# Patient Record
Sex: FEMALE | Race: WHITE | NOT HISPANIC OR LATINO | Employment: OTHER | URBAN - METROPOLITAN AREA
[De-identification: names, ages, dates, MRNs, and addresses within clinical notes are randomized per-mention and may not be internally consistent; named-entity substitution may affect disease eponyms.]

---

## 2023-02-11 PROBLEM — E55.9 VITAMIN D DEFICIENCY: Status: ACTIVE | Noted: 2023-02-11

## 2023-02-11 PROBLEM — R09.81 SINUS CONGESTION: Status: ACTIVE | Noted: 2023-02-11

## 2023-02-11 PROBLEM — H92.09 EAR PAIN: Status: ACTIVE | Noted: 2023-02-11

## 2023-02-11 PROBLEM — L98.9 LESION OF SKIN OF SCALP: Status: ACTIVE | Noted: 2023-02-11

## 2023-02-11 PROBLEM — K59.00 CONSTIPATION: Status: ACTIVE | Noted: 2023-02-11

## 2023-02-11 PROBLEM — S46.219A RUPTURE OF BICEPS TENDON: Status: ACTIVE | Noted: 2023-02-11

## 2023-02-11 PROBLEM — N39.0 RECURRENT UTI: Status: ACTIVE | Noted: 2023-02-11

## 2023-02-11 PROBLEM — M62.81 GENERALIZED MUSCLE WEAKNESS: Status: ACTIVE | Noted: 2023-02-11

## 2023-02-11 PROBLEM — L30.9 DERMATITIS: Status: ACTIVE | Noted: 2023-02-11

## 2023-02-11 PROBLEM — F41.9 ANXIETY DISORDER: Status: ACTIVE | Noted: 2023-02-11

## 2023-02-11 PROBLEM — E06.3 ACQUIRED AUTOIMMUNE HYPOTHYROIDISM: Status: ACTIVE | Noted: 2023-02-11

## 2023-02-11 PROBLEM — N31.9 NEUROGENIC BLADDER: Status: ACTIVE | Noted: 2023-02-11

## 2023-02-11 PROBLEM — L03.119 CELLULITIS OF LEG: Status: ACTIVE | Noted: 2023-02-11

## 2023-02-11 PROBLEM — R21 RASH OF HANDS: Status: ACTIVE | Noted: 2023-02-11

## 2023-02-11 PROBLEM — R53.83 FATIGUE: Status: ACTIVE | Noted: 2023-02-11

## 2023-02-11 PROBLEM — R33.9 INCOMPLETE BLADDER EMPTYING: Status: ACTIVE | Noted: 2023-02-11

## 2023-02-11 PROBLEM — D72.819 LEUKOPENIA: Status: ACTIVE | Noted: 2023-02-11

## 2023-02-11 PROBLEM — N95.2 ATROPHIC VAGINITIS: Status: ACTIVE | Noted: 2023-02-11

## 2023-02-11 PROBLEM — N39.0 UTI (URINARY TRACT INFECTION): Status: ACTIVE | Noted: 2023-02-11

## 2023-02-11 PROBLEM — N36.44 DSD (DETRUSOR AND SPHINCTER DYSSYNERGIA): Status: ACTIVE | Noted: 2023-02-11

## 2023-02-11 PROBLEM — R53.2: Status: ACTIVE | Noted: 2023-02-11

## 2023-02-11 PROBLEM — L21.9 SEBORRHEA: Status: ACTIVE | Noted: 2023-02-11

## 2023-02-11 PROBLEM — H10.30 ACUTE CONJUNCTIVITIS: Status: ACTIVE | Noted: 2023-02-11

## 2023-02-11 PROBLEM — J20.9 ACUTE BRONCHITIS: Status: ACTIVE | Noted: 2023-02-11

## 2023-02-11 PROBLEM — R93.1 ELEVATED CORONARY ARTERY CALCIUM SCORE: Status: ACTIVE | Noted: 2023-02-11

## 2023-02-11 PROBLEM — G35 MULTIPLE SCLEROSIS (MULTI): Status: ACTIVE | Noted: 2023-02-11

## 2023-02-11 PROBLEM — M54.50 LOWER BACK PAIN: Status: ACTIVE | Noted: 2023-02-11

## 2023-02-11 PROBLEM — N39.41 URGE INCONTINENCE: Status: ACTIVE | Noted: 2023-02-11

## 2023-02-11 PROBLEM — H69.90 EUSTACHIAN TUBE DYSFUNCTION: Status: ACTIVE | Noted: 2023-02-11

## 2023-02-11 PROBLEM — M75.21 BICEPS TENDINITIS OF RIGHT UPPER EXTREMITY: Status: ACTIVE | Noted: 2023-02-11

## 2023-02-11 PROBLEM — G35: Status: ACTIVE | Noted: 2023-02-11

## 2023-02-11 PROBLEM — M79.603 ARM PAIN: Status: ACTIVE | Noted: 2023-02-11

## 2023-02-11 PROBLEM — A08.4 VIRAL GASTROENTERITIS: Status: ACTIVE | Noted: 2023-02-11

## 2023-02-11 PROBLEM — A04.72 C. DIFFICILE COLITIS: Status: ACTIVE | Noted: 2023-02-11

## 2023-02-11 PROBLEM — N39.46 URINARY INCONTINENCE, MIXED: Status: ACTIVE | Noted: 2023-02-11

## 2023-02-11 PROBLEM — I25.10 CORONARY ARTERY DISEASE: Status: ACTIVE | Noted: 2023-02-11

## 2023-02-11 PROBLEM — E78.5 HYPERLIPIDEMIA: Status: ACTIVE | Noted: 2023-02-11

## 2023-02-11 PROBLEM — M25.519 SHOULDER PAIN: Status: ACTIVE | Noted: 2023-02-11

## 2023-02-11 RX ORDER — LANOLIN ALCOHOL/MO/W.PET/CERES
1 CREAM (GRAM) TOPICAL DAILY
COMMUNITY
Start: 2020-12-10

## 2023-02-11 RX ORDER — GABAPENTIN 800 MG/1
1 TABLET ORAL 3 TIMES DAILY
COMMUNITY
Start: 2016-04-12 | End: 2024-05-16 | Stop reason: SDUPTHER

## 2023-02-11 RX ORDER — CHOLECALCIFEROL (VITAMIN D3) 25 MCG
1 TABLET ORAL DAILY
COMMUNITY
Start: 2017-04-25

## 2023-02-11 RX ORDER — ROSUVASTATIN CALCIUM 10 MG/1
1 TABLET, COATED ORAL DAILY
COMMUNITY
Start: 2021-06-23 | End: 2023-03-13 | Stop reason: SDUPTHER

## 2023-02-11 RX ORDER — PRIMIDONE 50 MG/1
2 TABLET ORAL 2 TIMES DAILY
COMMUNITY
Start: 2015-08-18

## 2023-02-11 RX ORDER — SOLIFENACIN SUCCINATE 10 MG/1
1 TABLET, FILM COATED ORAL DAILY
COMMUNITY
Start: 2021-12-17

## 2023-02-11 RX ORDER — ESTRADIOL 0.1 MG/G
CREAM VAGINAL
COMMUNITY
Start: 2022-06-27 | End: 2024-01-10 | Stop reason: SDUPTHER

## 2023-02-11 RX ORDER — CITALOPRAM 20 MG/1
1 TABLET, FILM COATED ORAL DAILY
COMMUNITY
Start: 2015-05-27 | End: 2024-03-05

## 2023-02-11 RX ORDER — LORATADINE 10 MG/1
1 TABLET ORAL DAILY PRN
COMMUNITY
Start: 2018-12-04

## 2023-02-11 RX ORDER — TRIAMCINOLONE ACETONIDE 1 MG/G
OINTMENT TOPICAL 2 TIMES DAILY
COMMUNITY
Start: 2021-08-03 | End: 2024-01-15 | Stop reason: ALTCHOICE

## 2023-02-11 RX ORDER — FLUTICASONE PROPIONATE 50 MCG
2 SPRAY, SUSPENSION (ML) NASAL DAILY
COMMUNITY
Start: 2018-11-23 | End: 2023-03-13 | Stop reason: SDUPTHER

## 2023-02-11 RX ORDER — LACTOBACILLUS RHAMNOSUS GG 10B CELL
CAPSULE ORAL
COMMUNITY
End: 2024-01-15 | Stop reason: ALTCHOICE

## 2023-02-11 RX ORDER — NORTRIPTYLINE HYDROCHLORIDE 10 MG/1
2 CAPSULE ORAL NIGHTLY
COMMUNITY
Start: 2017-04-05 | End: 2023-12-14 | Stop reason: SDUPTHER

## 2023-02-11 RX ORDER — BACLOFEN 10 MG/1
10 TABLET ORAL 4 TIMES DAILY
COMMUNITY
Start: 2016-04-12 | End: 2024-05-16 | Stop reason: SDUPTHER

## 2023-03-02 LAB
BACTERIA, URINE: ABNORMAL /HPF
MUCUS, URINE: ABNORMAL /LPF
RBC, URINE: 8 /HPF (ref 0–5)
SQUAMOUS EPITHELIAL CELLS, URINE: 2 /HPF
WBC, URINE: 25 /HPF (ref 0–5)

## 2023-03-03 LAB — URINE CULTURE: NORMAL

## 2023-03-04 NOTE — RESULT ENCOUNTER NOTE
Urine culture did show positivity but multiple organisms were present    Most likely there is some contamination within the urine culture    It is recommended that we repeat a urinalysis if the patient can    We will try to send the order in on Monday we will use the new electronic medical record    Otherwise, continue the antibiotic as prescribed

## 2023-03-07 ENCOUNTER — TELEPHONE (OUTPATIENT)
Dept: PRIMARY CARE | Facility: CLINIC | Age: 73
End: 2023-03-07
Payer: MEDICARE

## 2023-03-07 ENCOUNTER — APPOINTMENT (OUTPATIENT)
Dept: LAB | Facility: LAB | Age: 73
End: 2023-03-07
Payer: MEDICARE

## 2023-03-07 NOTE — TELEPHONE ENCOUNTER
----- Message from Piyush Stephenson, DO sent at 3/4/2023 12:28 PM EST -----  Urine culture did show positivity but multiple organisms were present    Most likely there is some contamination within the urine culture    It is recommended that we repeat a urinalysis if the patient can    We will try to send the order in on Monday we will use the new electronic medical record    Otherwise, continue the antibiotic as prescribed

## 2023-03-09 LAB — URINE CULTURE: ABNORMAL

## 2023-03-13 ENCOUNTER — LAB (OUTPATIENT)
Dept: LAB | Facility: LAB | Age: 73
End: 2023-03-13
Payer: MEDICARE

## 2023-03-13 ENCOUNTER — OFFICE VISIT (OUTPATIENT)
Dept: PRIMARY CARE | Facility: CLINIC | Age: 73
End: 2023-03-13
Payer: MEDICARE

## 2023-03-13 VITALS
BODY MASS INDEX: 19.57 KG/M2 | HEART RATE: 67 BPM | DIASTOLIC BLOOD PRESSURE: 60 MMHG | SYSTOLIC BLOOD PRESSURE: 108 MMHG | HEIGHT: 64 IN

## 2023-03-13 DIAGNOSIS — G35 MULTIPLE SCLEROSIS (MULTI): ICD-10-CM

## 2023-03-13 DIAGNOSIS — R53.2: ICD-10-CM

## 2023-03-13 DIAGNOSIS — E46 PROTEIN-CALORIE MALNUTRITION, UNSPECIFIED SEVERITY (MULTI): ICD-10-CM

## 2023-03-13 DIAGNOSIS — Z00.00 ENCOUNTER FOR MEDICARE ANNUAL WELLNESS EXAM: Primary | ICD-10-CM

## 2023-03-13 DIAGNOSIS — N30.00 ACUTE CYSTITIS WITHOUT HEMATURIA: ICD-10-CM

## 2023-03-13 DIAGNOSIS — E55.9 VITAMIN D DEFICIENCY: ICD-10-CM

## 2023-03-13 DIAGNOSIS — E53.8 B12 DEFICIENCY: ICD-10-CM

## 2023-03-13 DIAGNOSIS — G35: ICD-10-CM

## 2023-03-13 DIAGNOSIS — E78.5 HYPERLIPIDEMIA, UNSPECIFIED HYPERLIPIDEMIA TYPE: ICD-10-CM

## 2023-03-13 DIAGNOSIS — L30.9 DERMATITIS: ICD-10-CM

## 2023-03-13 DIAGNOSIS — H69.93 DYSFUNCTION OF BOTH EUSTACHIAN TUBES: ICD-10-CM

## 2023-03-13 DIAGNOSIS — Z00.00 ROUTINE GENERAL MEDICAL EXAMINATION AT HEALTH CARE FACILITY: ICD-10-CM

## 2023-03-13 DIAGNOSIS — Z12.11 ENCOUNTER FOR SCREENING FOR MALIGNANT NEOPLASM OF COLON: ICD-10-CM

## 2023-03-13 DIAGNOSIS — G47.00 INSOMNIA, UNSPECIFIED TYPE: ICD-10-CM

## 2023-03-13 DIAGNOSIS — E06.3 ACQUIRED AUTOIMMUNE HYPOTHYROIDISM: ICD-10-CM

## 2023-03-13 LAB
ALANINE AMINOTRANSFERASE (SGPT) (U/L) IN SER/PLAS: 14 U/L (ref 7–45)
ALBUMIN (G/DL) IN SER/PLAS: 4.1 G/DL (ref 3.4–5)
ALKALINE PHOSPHATASE (U/L) IN SER/PLAS: 77 U/L (ref 33–136)
ANION GAP IN SER/PLAS: 9 MMOL/L (ref 10–20)
ASPARTATE AMINOTRANSFERASE (SGOT) (U/L) IN SER/PLAS: 20 U/L (ref 9–39)
BASOPHILS (10*3/UL) IN BLOOD BY AUTOMATED COUNT: 0.06 X10E9/L (ref 0–0.1)
BASOPHILS/100 LEUKOCYTES IN BLOOD BY AUTOMATED COUNT: 2.1 % (ref 0–2)
BILIRUBIN TOTAL (MG/DL) IN SER/PLAS: 0.5 MG/DL (ref 0–1.2)
CALCIDIOL (25 OH VITAMIN D3) (NG/ML) IN SER/PLAS: 45 NG/ML
CALCIUM (MG/DL) IN SER/PLAS: 9.1 MG/DL (ref 8.6–10.3)
CARBON DIOXIDE, TOTAL (MMOL/L) IN SER/PLAS: 29 MMOL/L (ref 21–32)
CHLORIDE (MMOL/L) IN SER/PLAS: 105 MMOL/L (ref 98–107)
CHOLESTEROL (MG/DL) IN SER/PLAS: 171 MG/DL (ref 0–199)
CHOLESTEROL IN HDL (MG/DL) IN SER/PLAS: 97.8 MG/DL
CHOLESTEROL/HDL RATIO: 1.7
COBALAMIN (VITAMIN B12) (PG/ML) IN SER/PLAS: 3315 PG/ML (ref 211–911)
CREATININE (MG/DL) IN SER/PLAS: 0.75 MG/DL (ref 0.5–1.05)
EOSINOPHILS (10*3/UL) IN BLOOD BY AUTOMATED COUNT: 0.22 X10E9/L (ref 0–0.4)
EOSINOPHILS/100 LEUKOCYTES IN BLOOD BY AUTOMATED COUNT: 7.6 % (ref 0–6)
ERYTHROCYTE DISTRIBUTION WIDTH (RATIO) BY AUTOMATED COUNT: 12.8 % (ref 11.5–14.5)
ERYTHROCYTE MEAN CORPUSCULAR HEMOGLOBIN CONCENTRATION (G/DL) BY AUTOMATED: 32.4 G/DL (ref 32–36)
ERYTHROCYTE MEAN CORPUSCULAR VOLUME (FL) BY AUTOMATED COUNT: 95 FL (ref 80–100)
ERYTHROCYTES (10*6/UL) IN BLOOD BY AUTOMATED COUNT: 4.14 X10E12/L (ref 4–5.2)
GFR FEMALE: 85 ML/MIN/1.73M2
GLUCOSE (MG/DL) IN SER/PLAS: 87 MG/DL (ref 74–99)
HEMATOCRIT (%) IN BLOOD BY AUTOMATED COUNT: 39.5 % (ref 36–46)
HEMOGLOBIN (G/DL) IN BLOOD: 12.8 G/DL (ref 12–16)
IMMATURE GRANULOCYTES/100 LEUKOCYTES IN BLOOD BY AUTOMATED COUNT: 0.3 % (ref 0–0.9)
LDL: 63 MG/DL (ref 0–99)
LEUKOCYTES (10*3/UL) IN BLOOD BY AUTOMATED COUNT: 2.9 X10E9/L (ref 4.4–11.3)
LYMPHOCYTES (10*3/UL) IN BLOOD BY AUTOMATED COUNT: 0.95 X10E9/L (ref 0.8–3)
LYMPHOCYTES/100 LEUKOCYTES IN BLOOD BY AUTOMATED COUNT: 32.8 % (ref 13–44)
MONOCYTES (10*3/UL) IN BLOOD BY AUTOMATED COUNT: 0.35 X10E9/L (ref 0.05–0.8)
MONOCYTES/100 LEUKOCYTES IN BLOOD BY AUTOMATED COUNT: 12.1 % (ref 2–10)
NEUTROPHILS (10*3/UL) IN BLOOD BY AUTOMATED COUNT: 1.31 X10E9/L (ref 1.6–5.5)
NEUTROPHILS/100 LEUKOCYTES IN BLOOD BY AUTOMATED COUNT: 45.1 % (ref 40–80)
PLATELETS (10*3/UL) IN BLOOD AUTOMATED COUNT: 200 X10E9/L (ref 150–450)
POTASSIUM (MMOL/L) IN SER/PLAS: 3.8 MMOL/L (ref 3.5–5.3)
PROTEIN TOTAL: 6.9 G/DL (ref 6.4–8.2)
SODIUM (MMOL/L) IN SER/PLAS: 139 MMOL/L (ref 136–145)
THYROTROPIN (MIU/L) IN SER/PLAS BY DETECTION LIMIT <= 0.05 MIU/L: 1.47 MIU/L (ref 0.44–3.98)
TRIGLYCERIDE (MG/DL) IN SER/PLAS: 50 MG/DL (ref 0–149)
UREA NITROGEN (MG/DL) IN SER/PLAS: 14 MG/DL (ref 6–23)
VLDL: 10 MG/DL (ref 0–40)

## 2023-03-13 PROCEDURE — 1036F TOBACCO NON-USER: CPT | Performed by: STUDENT IN AN ORGANIZED HEALTH CARE EDUCATION/TRAINING PROGRAM

## 2023-03-13 PROCEDURE — 1170F FXNL STATUS ASSESSED: CPT | Performed by: STUDENT IN AN ORGANIZED HEALTH CARE EDUCATION/TRAINING PROGRAM

## 2023-03-13 PROCEDURE — 1159F MED LIST DOCD IN RCRD: CPT | Performed by: STUDENT IN AN ORGANIZED HEALTH CARE EDUCATION/TRAINING PROGRAM

## 2023-03-13 PROCEDURE — 82306 VITAMIN D 25 HYDROXY: CPT

## 2023-03-13 PROCEDURE — 82607 VITAMIN B-12: CPT

## 2023-03-13 PROCEDURE — G0439 PPPS, SUBSEQ VISIT: HCPCS | Performed by: STUDENT IN AN ORGANIZED HEALTH CARE EDUCATION/TRAINING PROGRAM

## 2023-03-13 PROCEDURE — 80061 LIPID PANEL: CPT

## 2023-03-13 PROCEDURE — 99215 OFFICE O/P EST HI 40 MIN: CPT | Performed by: STUDENT IN AN ORGANIZED HEALTH CARE EDUCATION/TRAINING PROGRAM

## 2023-03-13 PROCEDURE — 85025 COMPLETE CBC W/AUTO DIFF WBC: CPT

## 2023-03-13 PROCEDURE — 1160F RVW MEDS BY RX/DR IN RCRD: CPT | Performed by: STUDENT IN AN ORGANIZED HEALTH CARE EDUCATION/TRAINING PROGRAM

## 2023-03-13 PROCEDURE — 80053 COMPREHEN METABOLIC PANEL: CPT

## 2023-03-13 PROCEDURE — 84443 ASSAY THYROID STIM HORMONE: CPT

## 2023-03-13 PROCEDURE — 36415 COLL VENOUS BLD VENIPUNCTURE: CPT

## 2023-03-13 RX ORDER — ROSUVASTATIN CALCIUM 10 MG/1
10 TABLET, COATED ORAL DAILY
Qty: 90 TABLET | Refills: 1 | Status: SHIPPED | OUTPATIENT
Start: 2023-03-13 | End: 2023-08-22

## 2023-03-13 RX ORDER — FLUTICASONE PROPIONATE 50 MCG
2 SPRAY, SUSPENSION (ML) NASAL DAILY
Qty: 16 G | Refills: 2 | Status: SHIPPED | OUTPATIENT
Start: 2023-03-13 | End: 2023-08-22

## 2023-03-13 ASSESSMENT — PATIENT HEALTH QUESTIONNAIRE - PHQ9
SUM OF ALL RESPONSES TO PHQ9 QUESTIONS 1 AND 2: 0
1. LITTLE INTEREST OR PLEASURE IN DOING THINGS: NOT AT ALL
2. FEELING DOWN, DEPRESSED OR HOPELESS: NOT AT ALL

## 2023-03-13 ASSESSMENT — ENCOUNTER SYMPTOMS
LOSS OF SENSATION IN FEET: 0
DEPRESSION: 0
OCCASIONAL FEELINGS OF UNSTEADINESS: 1

## 2023-03-13 ASSESSMENT — ACTIVITIES OF DAILY LIVING (ADL)
BATHING: NEEDS ASSISTANCE
DOING_HOUSEWORK: NEEDS ASSISTANCE
MANAGING_FINANCES: INDEPENDENT
TAKING_MEDICATION: INDEPENDENT
DRESSING: NEEDS ASSISTANCE
GROCERY_SHOPPING: NEEDS ASSISTANCE

## 2023-03-13 NOTE — PROGRESS NOTES
Subjective   Reason for Visit: Coby Bonner is an 72 y.o. female here for a Medicare Wellness visit.          Reviewed all medications by prescribing practitioner or clinical pharmacist (such as prescriptions, OTCs, herbal therapies and supplements) and documented in the medical record.    HPI    Patient Self Assessment of Health Status  Patient Self Assessment: Fair    Nutrition and Exercise  Current Diet: Well Balanced Diet, Heart Healthy Diet  Adequate Fluid Intake: Yes  Caffeine: No  Exercise Frequency: No Exercise    Functional Ability/Level of Safety  Cognitive Impairment Observed: No cognitive impairment observed    Home Safety Risk Factors: None    Patient Care Team:  Piyush Stephenson DO as PCP - General  Piyush Stephenson, DO as PCP - [facility] Attributed Provider     Health Maintenance/MCR  Overall patient is doing well with some concerns.  Shingrix: 1/2018 PCV13:12/2016 PPSV23 9/2020  COVID-19 vaccine 3 doses of pfizer   Mammogram complete 01/2023 for annual repeat.  Last colonoscopy ten years ago with repeat this year. Denies family history of colon cancer. Requesting requisition for cologuard test.   Reports a healthy, balanced diet and limited activity due to MS.   Denies tobacco or etoh use.     2. Hyperlipidemia  Currently taking rosuvastatin 10 mg daily. Requesting refills.   Last lipid panel in March 2022 within normal limits.  CT Cardiac Calcium score completed December 2020 with score of 523.13.  Follows with Dr. Weller    3. Eustachian Tube Dysfunction  Uses fluticasone spray daily. Requesting refills.  Reports feels stable on this medication.    4.Functional quadriplegia secondary to multiple sclerosis  Follows with neurology Dr. Harding who unfortunately retired and now recently saw a new neurologist earlier this month.  Reports feeling stable currently.   Continues to take Baclofen, Nortriptyline, Primidone     5. Dermatitis   Mildly pruritic, erythematous plaque on patient's right dorsal  "wrist that has been present for months.  Has attempted to treat for past month at home with hydrocortisone and neosporin without relief.  Denies tenderness, limited range of motion in wrist, swelling of joint, fevers.   Was previously treated for dermatitis elsewhere with triamcinolone.   Previously saw derm for basal cell removal and willing to follow with them again.     6. Urinary Tract Infection  Follows with urology for frequent UTIs and neurogenic bladder.  Self-caths 6 times daily.   Reporting two days left on current Nitrofurantoin course for UTI and reports feeling back to baseline with no current odor.     7. Insomnia  Complaining insomnia for 1+ month.   Denies difficulty falling asleep, but will awaken in the night with the urge to urinate and is unable to fall asleep afterwards.  Reports ~4 hours of sleep nightly.     8. Acquired autoimmune hypothyroidism  Has a history of elevated TSH of 4.04 in October of 2020   Repeat blood work on 03/2022 showed levels back to a normal range   Denies any signs of symptoms of hypothyroidism     9. Vitamin B12 Deficiency  Last Vitamin B12 level was 177 on 12/2020  Currently taking B12 supplements daily.     10. Vitamin D Deficiency   Last Vitamin D level was 24 on 9/2021  Is currently taking Vitamin D tablets       Review of Systems   All other systems reviewed and are negative.    Objective   Vitals:  /60 (BP Location: Left arm, Patient Position: Sitting, BP Cuff Size: Adult)   Pulse 67   Ht 1.626 m (5' 4\")   BMI 19.57 kg/m²       Physical Exam    CONSTITUTIONAL - well nourished, well developed, looks like stated age, in no acute distress, not ill-appearing, and not tired appearing  SKIN - dry skin with slight rash on right dorsal wrist  HEAD - no trauma, normocephalic  EYES - extraocular muscles are intact, and normal external exam  ENT - TM's intact, no injection, no signs of infection, uvula midline, normal tongue movement and throat normal, no " exudate  NECK - supple without rigidity, no neck mass was observed, no thyromegaly or thyroid nodules  CHEST - clear to auscultation, no wheezing, no crackles and no rales, good effort  CARDIAC - regular rate and regular rhythm, no skipped beats, no murmur  ABDOMEN - no organomegaly, soft, nontender, nondistended, no guarding/rebound/rigidity,   EXTREMITIES - +1 bilateral pitting edema, no deformities  NEUROLOGICAL - abnormal/slow gait, essential tremor, irregular and abnormal balance, uses a walker   PSYCHIATRIC - alert, pleasant and cordial, age-appropriate  IMMUNOLOGIC - no cervical lymphadenopathy      Assessment/Plan     Health Maintenance/[facility]  A complete history and physical were performed today.  Please continue to attempt to eat a healthy, balanced diet and stay active.  We provided you with a requisition for cologuard colon cancer screening for you to complete at your convenience.     Hyperlipidemia  Continue taking rosuvastatin 10 mg daily.  Refill have been sent to your pharmacy.  We ordered blood work today.  We will notify you of the results and make treatment recommendations accordingly.   Continue to follow with Dr. Weller as needed.    Eustachian Tube Dysfunction  Use fluticasone spray daily.   Refills have been sent to your pharmacy.    Functional quadriplegia secondary to multiple sclerosis  Continue to follow with neurology Dr. Harding.   Continue to take Baclofen, Nortriptyline, Primidone as he prescribes.    Dermatitis   We gave you a referral for dermatology today.  Please schedule with them at your earliest mutual convenience.     Urinary Tract Infection  Continue to follow with urology.  Stable, no changes in recommendations.    Insomnia  I'm sorry to hear you are having difficulty with sleep.  Please follow up with your neurologist for treatment recommendations regarding this.     Acquired autoimmune hypothyroidism  We ordered blood work today.  We will notify you of the results and make  treatment recommendations accordingly.     Vitamin B12 Deficiency  We ordered blood work today.  We will notify you of the results and make treatment recommendations accordingly.   Continue to take your Vitamin B12 supplement.    Vitamin D Deficiency   We ordered blood work today.  We will notify you of the results and make treatment recommendations accordingly.   Continue to take your Vitamin D supplement.    If you have any questions or concerns regarding your care please call the office.

## 2023-03-13 NOTE — RESULT ENCOUNTER NOTE
Vitamin B12 very elevated at 3315  I would recommend that she slowly decreases the amount of B12 supplementation she is taking    Otherwise sugar, kidneys, liver, electrolytes are all within normal limits    Complete blood cell count continues to show a slightly low white blood cell count of 2.9 but this has been stable and no anemia noted    Vitamin D well within normal limits at 45    Thyroid well within normal limits at 1.47    Cholesterol looks fantastic at 171, HDL 97, LDL 63, triglycerides 50    Continue all medications without any changes

## 2023-03-14 ENCOUNTER — TELEPHONE (OUTPATIENT)
Dept: PRIMARY CARE | Facility: CLINIC | Age: 73
End: 2023-03-14
Payer: MEDICARE

## 2023-03-14 NOTE — TELEPHONE ENCOUNTER
----- Message from Piyush Stephenson,  sent at 3/13/2023  5:25 PM EDT -----  Vitamin B12 very elevated at 3315  I would recommend that she slowly decreases the amount of B12 supplementation she is taking    Otherwise sugar, kidneys, liver, electrolytes are all within normal limits    Complete blood cell count continues to show a slightly low white blood cell count of 2.9 but this has been stable and no anemia noted    Vitamin D well within normal limits at 45    Thyroid well within normal limits at 1.47    Cholesterol looks fantastic at 171, HDL 97, LDL 63, triglycerides 50    Continue all medications without any changes

## 2023-03-21 LAB — NONINV COLON CA DNA+OCC BLD SCRN STL QL: NORMAL

## 2023-03-21 NOTE — RESULT ENCOUNTER NOTE
Unfortunately Kavon was not able to be processed    I would recommend that she will have to do another sample and we will make recommendations after that    I guess the actual [employer] company will reach out to her in the near future

## 2023-03-23 ENCOUNTER — TELEPHONE (OUTPATIENT)
Dept: PRIMARY CARE | Facility: CLINIC | Age: 73
End: 2023-03-23
Payer: MEDICARE

## 2023-04-20 LAB — NONINV COLON CA DNA+OCC BLD SCRN STL QL: NEGATIVE

## 2023-04-26 NOTE — RESULT ENCOUNTER NOTE
Colkourtneyrd for colon cancer screening is negative    We will continue screening in 3 years, due 2026 Drysol Pregnancy And Lactation Text: This medication is considered safe during pregnancy and breast feeding.

## 2023-08-22 DIAGNOSIS — H69.93 DYSFUNCTION OF BOTH EUSTACHIAN TUBES: ICD-10-CM

## 2023-08-22 DIAGNOSIS — E78.5 HYPERLIPIDEMIA, UNSPECIFIED HYPERLIPIDEMIA TYPE: ICD-10-CM

## 2023-08-22 RX ORDER — FLUTICASONE PROPIONATE 50 MCG
2 SPRAY, SUSPENSION (ML) NASAL DAILY
Qty: 16 ML | Refills: 2 | Status: SHIPPED | OUTPATIENT
Start: 2023-08-22 | End: 2024-01-15 | Stop reason: SDUPTHER

## 2023-08-22 RX ORDER — ROSUVASTATIN CALCIUM 10 MG/1
10 TABLET, COATED ORAL DAILY
Qty: 90 TABLET | Refills: 0 | Status: SHIPPED | OUTPATIENT
Start: 2023-08-22 | End: 2023-11-16

## 2023-10-02 ENCOUNTER — PROCEDURE VISIT (OUTPATIENT)
Dept: UROLOGY | Facility: CLINIC | Age: 73
End: 2023-10-02
Payer: MEDICARE

## 2023-10-02 DIAGNOSIS — N30.00 ACUTE CYSTITIS WITHOUT HEMATURIA: Primary | ICD-10-CM

## 2023-10-02 DIAGNOSIS — N39.41 URGE INCONTINENCE: ICD-10-CM

## 2023-10-02 DIAGNOSIS — N31.9 NEUROGENIC BLADDER: ICD-10-CM

## 2023-10-02 DIAGNOSIS — N30.00 ACUTE CYSTITIS WITHOUT HEMATURIA: ICD-10-CM

## 2023-10-02 DIAGNOSIS — R33.9 INCOMPLETE BLADDER EMPTYING: ICD-10-CM

## 2023-10-02 DIAGNOSIS — N39.0 RECURRENT UTI: ICD-10-CM

## 2023-10-02 DIAGNOSIS — N95.2 ATROPHIC VAGINITIS: ICD-10-CM

## 2023-10-02 DIAGNOSIS — G35 MULTIPLE SCLEROSIS (MULTI): ICD-10-CM

## 2023-10-02 LAB
POC APPEARANCE, URINE: CLEAR
POC BILIRUBIN, URINE: NEGATIVE
POC BLOOD, URINE: ABNORMAL
POC COLOR, URINE: YELLOW
POC GLUCOSE, URINE: NEGATIVE MG/DL
POC KETONES, URINE: NEGATIVE MG/DL
POC LEUKOCYTES, URINE: ABNORMAL
POC NITRITE,URINE: POSITIVE
POC PH, URINE: 7.5 PH
POC PROTEIN, URINE: NEGATIVE MG/DL
POC SPECIFIC GRAVITY, URINE: 1.02
POC UROBILINOGEN, URINE: 0.2 EU/DL

## 2023-10-02 PROCEDURE — 99213 OFFICE O/P EST LOW 20 MIN: CPT | Performed by: OBSTETRICS & GYNECOLOGY

## 2023-10-02 PROCEDURE — 99213 OFFICE O/P EST LOW 20 MIN: CPT | Mod: PN | Performed by: OBSTETRICS & GYNECOLOGY

## 2023-10-02 PROCEDURE — 81003 URINALYSIS AUTO W/O SCOPE: CPT | Mod: QW,PN | Performed by: OBSTETRICS & GYNECOLOGY

## 2023-10-02 RX ORDER — SULFAMETHOXAZOLE AND TRIMETHOPRIM 800; 160 MG/1; MG/1
1 TABLET ORAL 2 TIMES DAILY
Qty: 14 TABLET | Refills: 0 | Status: SHIPPED | OUTPATIENT
Start: 2023-10-02 | End: 2023-10-09

## 2023-10-02 RX ORDER — SULFAMETHOXAZOLE AND TRIMETHOPRIM 800; 160 MG/1; MG/1
1 TABLET ORAL 2 TIMES DAILY
Qty: 14 TABLET | Refills: 0 | Status: SHIPPED | OUTPATIENT
Start: 2023-10-02 | End: 2023-10-02 | Stop reason: SDUPTHER

## 2023-10-02 NOTE — PROGRESS NOTES
Subjective   Patient ID: Coby Bonner is a 72 y.o. female who presents for No chief complaint on file..  HPI  72-year-old with chronic urinary tract infections, urinary incontinence, neurogenic bladder with longstanding history of MS having undergone InterStim stage II 10/19/2022 at the [facility] and 200 units of Botox 06/26/2023 presenting for 200 units of Botox today 10/02/2023.       The patient  appears to be having UTI like symptoms, she will be rescheduled at her earliest convenience.     She has no other complaints.      From Previous note  72-year-old with chronic urinary tract infections, urinary incontinence, neurogenic bladder with longstanding history of MS having undergone InterStim stage II 10/19/2022 at the [facility] and 200 units of Botox 06/26/2023.      She endorses relief from Botox injections. She reports experiencing leakage when she needs to urinate. She uses her catheter at night and voids every 3 hours. Her Interstim is program 4 and 1.8 . She notes leaking when she waits greater than 4 hours. She has noted terminal incontinence with her first void of the morning.     She is overall very satisfied with her present therapy.     From previous note  72-year-old with chronic urinary tract infections, urinary incontinence, neurogenic bladder with longstanding history of MS having undergone InterStim stage II 10/19/2022 at the [facility] and 200 units of Botox today 06/26/2023.       She has no other complaints.      From previous note  72-year-old with chronic urinary tract infections, urinary incontinence, neurogenic bladder with longstanding history of MS having undergone InterStim stage II 10/19/2022 at the [facility].     The patient was last seen in November 2022. continues to be overall satisfied with her InterStim and CIC use. She has not made any adjustments to her InterStim device. She is utilizing her CIC every 2-3 hours, 5-7  times daily. However she does continue to notice incontinence complaints. She notes 1-2 episodes of nocturia., she states she empties her bladder before going to bed but notes urgency after 6 hours and has to rush to the bathroom and is particularly bothered by it. She denies any UTI like symptoms.      She complains of constipation, but denies fecal or flatal incontinence.     She denies any vaginal complaints, no abnormal vaginal bleeding or discharge.      She has no other complaints.         From previous note  72-year-old with chronic urinary tract infections, urinary incontinence, neurogenic bladder with longstanding history of MS having undergone InterStim stage II on 10/19/2022.     The patient reports she is over all very satisfied with Interstim Stage II. She reports improvement in her bladder symptoms. She denies any urinary incontinence.     She complains of constipation, but denies fecal or flatal incontinence.     She denies any vaginal complaints, no abnormal vaginal bleeding or discharge.      She has no other complaints.         From previous note  71-year-old with chronic urinary tract infections, urinary incontinence, neurogenic bladder with longstanding history of MS.     The patient has increased her CIC use to roughly every 2-3 hours. She is emptying her bladder of between 5 and 8 ounces of urine. However even with this she continues to note urgency and urge related incontinence in between these catheterizations. She continues to note bothersome nighttime urgency and incontinence as well.     She denies any UTI-like symptoms today.     She has no other complaints.     From previous note  71-year-old presenting as a referral from Dr. Lopez with longstanding history of multiple sclerosis and neurogenic bladder with recurrent urinary tract infections.     The patient presents today with her . She utilizes CIC 3 times a day at 10:00, 4:00 and 10:00. Outside of these episodes she notes  "leaking. She does have near nightly enuresis as well. She has been treated on multiple occasions with 300 units Botox. Patient was most recently treated in May 2022. She stated that she had \"a few weeks of relief\" before her symptoms returned. She does believe that she has a urinary tract infection. . She could not provide a urine sample today. PVR is 84. She does not have any catheter supplies with her and would rather provide a sample tomorrow when she is visiting her primary care physician.     She otherwise denies any abnormal vaginal bleeding or discharge. She does not desire a pelvic exam today. She is not sexually active. She does have defecatory urgency.     She has no other complaints.     Review of Systems  Constitutional: No fever, No chills and No fatigue.   Eyes: No vision problems and No dryness of the eyes.   ENT: No dry mouth, No hearing loss and No nosebleeds.   Cardiovascular: No chest pain, No palpitations and No orthopnea.   Respiratory: No shortness of breath, No cough and No wheezing.   Gastrointestinal: No abdominal pain, No constipation, No nausea, No diarrhea, No vomiting and No melena.   Genitourinary: As noted in HPI.   Musculoskeletal: No back pain, No myalgias, No muscle weakness, No joint swelling and No leg edema.   Integumentary: No rashes, No skin lesion and No itching.   Neurological: No headache, No numbness and No dizziness.   Psychiatric: No sleep disturbances, No anxiety and No depression.   Endocrine: No hot flashes, No loss of hair and No hirsutism.   Hematologic/Lymphatic: No swollen glands, No tendency for easy bleeding and No tendency for easy bruising.   All other systems have been reviewed and are negative for complaint.        Objective   Physical Exam  Constitutional: Uses walker. Staccato speech, but alert and in no acute distress, well developed, well nourished.   Head and Face: head and face: unremarkable.   Eyes, Ears, Nose, Mouth, and Throat: unremarkable external " exam - nonicteric sclera, extraocular movements intact (EOMI) and no ptosis, unremarkable external inspection of ears and nose.   Neck: no neck asymmetry, supple, thyroid not enlarged and there were no palpable thyroid nodules.   Cardiovascular: unremarkable distal pulses.   Pulmonary: no respiratory distress, unremarkable respiratory effort., clear bilateral breath sounds.   Abdomen: soft nontender; no abdominal mass palpated, no organomegaly and no hernias.   Musculoskeletal: no joint swelling seen, normal movements of all extremities.   Skin: normal skin color and pigmentation, normal skin turgor, and no rash.   Neurologic: cranial nerves: non-focal, grossly intact.   Lymphatic: no cervical lymphadenopathy, palpation of lymph nodes in other areas: normal.   Psychiatric: alert and oriented x 3, affect normal to patient baseline, mood: appropriate and judgment and insight: intact.     Patient ID: Coby Bonner is a 72 y.o. female.    Procedures  Assessment/Plan     72-year-old with chronic urinary tract infections, urinary incontinence, neurogenic bladder with longstanding history of MS having undergone InterStim stage II 10/19/2022 at the [facility] and 200 units of Botox 06/26/2023 with active urinary tract infection.     #1 the patient has done well with her combined Botox and InterStim therapy. The patient is overall very satisfied with her combined Botox and InterStim stage II device. She is presently on program 4 at 1.8 V. She has noted significant improvements in her urinary incontinence complaints. She has previously utilized 300 units Botox with Dr. Lopez with minimal long-term benefit. She continues to utilize her CIC roughly every 3 hours. She will continue her vaginal estrogen therapy. We discussed the importance of appropriate vaginal hygiene and not utilizing any soaps or irritants. She has had no bladder abnormalities noted on cystoscopic evaluation. \We discussed the importance of  timed voiding every 3-4 hours with her CIC.  We discussed her urinary tract infection today she will be started on Bactrim therapy now.  She will be present in 2 to 3 weeks for 200 units Botox.     #2 we discussed her bowel related complaints. We discussed potential for MiraLAX therapy instead of her milk of magnesia. We discussed the importance of fiber therapy. We discussed titrating her fiber and laxative use to a soft bowel movement every day to 2 days.     #3 the patient will follow-up at her convenience for repeat 200 units Botox after adequate treatment of her UTI.  Pending urine culture.     FDEERICO Hays MD    Scribe Attestation  By signing my name below, I, Antonio Dowling attest that this documentation has been prepared under the direction and in the presence of Goyo Hays MD. All medical record entries made by the Scribe were at my direction or personally dictated by me. I have reviewed the chart and agree that the record accurately reflects my personal performance of the history, physical exam, discussion and plan.

## 2023-10-05 LAB — BACTERIA UR CULT: ABNORMAL

## 2023-10-11 ENCOUNTER — PREP FOR PROCEDURE (OUTPATIENT)
Dept: UROLOGY | Facility: CLINIC | Age: 73
End: 2023-10-11
Payer: MEDICARE

## 2023-10-24 ENCOUNTER — APPOINTMENT (OUTPATIENT)
Dept: UROLOGY | Facility: CLINIC | Age: 73
End: 2023-10-24
Payer: MEDICARE

## 2023-10-29 NOTE — PROGRESS NOTES
Subjective   Patient ID: Coby Bonner is a 73 y.o. female who presents for 200 units of intradetrusor Botox.     HPI   73-year-old with chronic urinary tract infections, urinary incontinence, neurogenic bladder with longstanding history of MS having undergone InterStim stage II 10/19/2022 at the [facility] and 200 units of Botox 06/26/2023 with a history of urinary tract infection. She presents today 10/30/2023 for repeat 200 units of Botox    Unfortunately the patient has an active infection today, urinalysis is grossly infected culture. She is currently utilizing Amoxicillin for a tooth abscess. She denies utilizing the pyridium. Her last UTI from 10/02/2023 was appropriately treated with Bactrim.     She has no other complaints.    From Previous note  72-year-old with chronic urinary tract infections, urinary incontinence, neurogenic bladder with longstanding history of MS having undergone InterStim stage II 10/19/2022 at the [facility] and 200 units of Botox 06/26/2023 presenting for 200 units of Botox today 10/02/2023.         The patient  appears to be having UTI like symptoms, she will be rescheduled at her earliest convenience.      She has no other complaints.        From Previous note  72-year-old with chronic urinary tract infections, urinary incontinence, neurogenic bladder with longstanding history of MS having undergone InterStim stage II 10/19/2022 at the [facility] and 200 units of Botox 06/26/2023.      She endorses relief from Botox injections. She reports experiencing leakage when she needs to urinate. She uses her catheter at night and voids every 3 hours. Her Interstim is program 4 and 1.8 . She notes leaking when she waits greater than 4 hours. She has noted terminal incontinence with her first void of the morning.     She is overall very satisfied with her present therapy.     From previous note  72-year-old with chronic urinary tract infections,  urinary incontinence, neurogenic bladder with longstanding history of MS having undergone InterStim stage II 10/19/2022 at the [facility] and 200 units of Botox today 06/26/2023.       She has no other complaints.      From previous note  72-year-old with chronic urinary tract infections, urinary incontinence, neurogenic bladder with longstanding history of MS having undergone InterStim stage II 10/19/2022 at the [facility].     The patient was last seen in November 2022. continues to be overall satisfied with her InterStim and CIC use. She has not made any adjustments to her InterStim device. She is utilizing her CIC every 2-3 hours, 5-7 times daily. However she does continue to notice incontinence complaints. She notes 1-2 episodes of nocturia., she states she empties her bladder before going to bed but notes urgency after 6 hours and has to rush to the bathroom and is particularly bothered by it. She denies any UTI like symptoms.      She complains of constipation, but denies fecal or flatal incontinence.     She denies any vaginal complaints, no abnormal vaginal bleeding or discharge.      She has no other complaints.         From previous note  72-year-old with chronic urinary tract infections, urinary incontinence, neurogenic bladder with longstanding history of MS having undergone InterStim stage II on 10/19/2022.     The patient reports she is over all very satisfied with Interstim Stage II. She reports improvement in her bladder symptoms. She denies any urinary incontinence.     She complains of constipation, but denies fecal or flatal incontinence.     She denies any vaginal complaints, no abnormal vaginal bleeding or discharge.      She has no other complaints.         From previous note  71-year-old with chronic urinary tract infections, urinary incontinence, neurogenic bladder with longstanding history of MS.     The patient has increased her CIC use to roughly every 2-3  "hours. She is emptying her bladder of between 5 and 8 ounces of urine. However even with this she continues to note urgency and urge related incontinence in between these catheterizations. She continues to note bothersome nighttime urgency and incontinence as well.     She denies any UTI-like symptoms today.     She has no other complaints.     From previous note  71-year-old presenting as a referral from Dr. Lopez with longstanding history of multiple sclerosis and neurogenic bladder with recurrent urinary tract infections.     The patient presents today with her . She utilizes CIC 3 times a day at 10:00, 4:00 and 10:00. Outside of these episodes she notes leaking. She does have near nightly enuresis as well. She has been treated on multiple occasions with 300 units Botox. Patient was most recently treated in May 2022. She stated that she had \"a few weeks of relief\" before her symptoms returned. She does believe that she has a urinary tract infection. . She could not provide a urine sample today. PVR is 84. She does not have any catheter supplies with her and would rather provide a sample tomorrow when she is visiting her primary care physician.     She otherwise denies any abnormal vaginal bleeding or discharge. She does not desire a pelvic exam today. She is not sexually active. She does have defecatory urgency.     She has no other complaints.   Review of Systems  Constitutional: No fever, No chills and No fatigue.   Eyes: No vision problems and No dryness of the eyes.   ENT: No dry mouth, No hearing loss and No nosebleeds.   Cardiovascular: No chest pain, No palpitations and No orthopnea.   Respiratory: No shortness of breath, No cough and No wheezing.   Gastrointestinal: No abdominal pain, No constipation, No nausea, No diarrhea, No vomiting and No melena.   Genitourinary: As noted in HPI.   Musculoskeletal: No back pain, No myalgias, No muscle weakness, No joint swelling and No leg edema. "   Integumentary: No rashes, No skin lesion and No itching.   Neurological: No headache, No numbness and No dizziness.   Psychiatric: No sleep disturbances, No anxiety and No depression.   Endocrine: No hot flashes, No loss of hair and No hirsutism.   Hematologic/Lymphatic: No swollen glands, No tendency for easy bleeding and No tendency for easy bruising.   All other systems have been reviewed and are negative for complaint.        Objective     PHYSICAL EXAMINATION:  No LMP recorded.  Body mass index is 22.81 kg/m².  /59   Pulse 86   Wt 60.3 kg (132 lb 14.4 oz)   BMI 22.81 kg/m²   General Appearance: well appearing  Neuro: Alert and oriented   HEENT: mucous membranes moist, neck supple  Resp: No respiratory distress, normal work of breathing  MSK: normal range of motion, gait appropriate        Assessment/Plan      73-year-old with chronic urinary tract infections, urinary incontinence, neurogenic bladder with longstanding history of MS having undergone InterStim stage II 10/19/2022 at the [facility] and 200 units of Botox 06/26/2023 with active urinary tract infection.     #1 the patient has done well with her combined Botox and InterStim therapy in the past but unfortunately presents today with an active urinary tract infection. The patient is overall very satisfied with her combined Botox and InterStim stage II device. She is presently on program 4 at 1.8 V. She has noted significant improvements in her urinary incontinence complaints. She has previously utilized 300 units Botox with Dr. Lopez with minimal long-term benefit. She continues to utilize her CIC roughly every 3 hours. She will continue her vaginal estrogen therapy. We discussed the importance of appropriate vaginal hygiene and not utilizing any soaps or irritants. She has had no bladder abnormalities noted on cystoscopic evaluation. We discussed the importance of timed voiding every 3-4 hours with her CIC.  She does have an  active UTI today 10/30/2023 and we discussed proceeding with 200 units Botox after adequate treatment of this infection in 1 to 2 weeks.    #2 we discussed the patient's persistent urinary tract infection.  We discussed proceeding with appropriate antibiotic therapy pending her urine culture and then getting on a prophylactic antibiotic thereafter.     #3 we discussed her bowel related complaints. We discussed potential for MiraLAX therapy instead of her milk of magnesia. We discussed the importance of fiber therapy. We discussed titrating her fiber and laxative use to a soft bowel movement every day to 2 days.     #4 the patient will be contacted with the results of her urine culture when available to discuss therapy and prophylaxis.  She will follow-up in 1 to 2 weeks for 200 units intradetrusor Botox.     FEDERICO Hays MD     Scribe Attestation  By signing my name below, ILinda Scribe attest that this documentation has been prepared under the direction and in the presence of Goyo Hays MD. All medical record entries made by the Scribe were at my direction or personally dictated by me. I have reviewed the chart and agree that the record accurately reflects my personal performance of the history, physical exam, discussion and plan.

## 2023-10-30 ENCOUNTER — PROCEDURE VISIT (OUTPATIENT)
Dept: UROLOGY | Facility: CLINIC | Age: 73
End: 2023-10-30
Payer: MEDICARE

## 2023-10-30 VITALS
WEIGHT: 132.9 LBS | DIASTOLIC BLOOD PRESSURE: 59 MMHG | HEART RATE: 86 BPM | SYSTOLIC BLOOD PRESSURE: 118 MMHG | BODY MASS INDEX: 22.81 KG/M2

## 2023-10-30 DIAGNOSIS — N39.0 RECURRENT UTI: ICD-10-CM

## 2023-10-30 DIAGNOSIS — N39.0 ACUTE UTI: Primary | ICD-10-CM

## 2023-10-30 DIAGNOSIS — K59.01 SLOW TRANSIT CONSTIPATION: ICD-10-CM

## 2023-10-30 DIAGNOSIS — N39.41 URGE INCONTINENCE: ICD-10-CM

## 2023-10-30 DIAGNOSIS — N31.9 NEUROGENIC BLADDER: ICD-10-CM

## 2023-10-30 PROCEDURE — 87086 URINE CULTURE/COLONY COUNT: CPT | Performed by: OBSTETRICS & GYNECOLOGY

## 2023-10-30 PROCEDURE — 99213 OFFICE O/P EST LOW 20 MIN: CPT | Mod: PN | Performed by: OBSTETRICS & GYNECOLOGY

## 2023-10-30 PROCEDURE — 99213 OFFICE O/P EST LOW 20 MIN: CPT | Performed by: OBSTETRICS & GYNECOLOGY

## 2023-11-01 DIAGNOSIS — N39.0 CHRONIC UTI: ICD-10-CM

## 2023-11-01 DIAGNOSIS — N39.0 ACUTE UTI: Primary | ICD-10-CM

## 2023-11-01 LAB — BACTERIA UR CULT: ABNORMAL

## 2023-11-01 RX ORDER — NITROFURANTOIN (MACROCRYSTALS) 100 MG/1
100 CAPSULE ORAL 2 TIMES DAILY
Qty: 14 CAPSULE | Refills: 0 | Status: SHIPPED | OUTPATIENT
Start: 2023-11-01 | End: 2023-11-10 | Stop reason: ALTCHOICE

## 2023-11-01 RX ORDER — NITROFURANTOIN MACROCRYSTALS 50 MG/1
CAPSULE ORAL
Qty: 90 CAPSULE | Refills: 3 | Status: SHIPPED | OUTPATIENT
Start: 2023-11-01

## 2023-11-09 DIAGNOSIS — E78.5 HYPERLIPIDEMIA, UNSPECIFIED HYPERLIPIDEMIA TYPE: ICD-10-CM

## 2023-11-10 ENCOUNTER — PROCEDURE VISIT (OUTPATIENT)
Dept: UROLOGY | Facility: CLINIC | Age: 73
End: 2023-11-10
Payer: MEDICARE

## 2023-11-10 VITALS
WEIGHT: 133 LBS | HEART RATE: 79 BPM | DIASTOLIC BLOOD PRESSURE: 70 MMHG | BODY MASS INDEX: 22.83 KG/M2 | SYSTOLIC BLOOD PRESSURE: 120 MMHG

## 2023-11-10 DIAGNOSIS — N39.0 RECURRENT UTI: ICD-10-CM

## 2023-11-10 DIAGNOSIS — N31.9 NEUROGENIC BLADDER: ICD-10-CM

## 2023-11-10 DIAGNOSIS — N39.41 URGE INCONTINENCE: Primary | ICD-10-CM

## 2023-11-10 DIAGNOSIS — G35 MULTIPLE SCLEROSIS (MULTI): ICD-10-CM

## 2023-11-10 PROCEDURE — 99213 OFFICE O/P EST LOW 20 MIN: CPT | Performed by: OBSTETRICS & GYNECOLOGY

## 2023-11-10 PROCEDURE — 52287 CYSTOSCOPY CHEMODENERVATION: CPT | Performed by: OBSTETRICS & GYNECOLOGY

## 2023-11-10 PROCEDURE — 2500000004 HC RX 250 GENERAL PHARMACY W/ HCPCS (ALT 636 FOR OP/ED): Mod: JZ | Performed by: OBSTETRICS & GYNECOLOGY

## 2023-11-10 RX ORDER — NITROFURANTOIN 25; 75 MG/1; MG/1
100 CAPSULE ORAL 2 TIMES DAILY
Qty: 10 CAPSULE | Refills: 0 | Status: SHIPPED | OUTPATIENT
Start: 2023-11-10 | End: 2023-11-15

## 2023-11-10 RX ADMIN — ONABOTULINUMTOXINA 200 UNITS: 100 INJECTION, POWDER, LYOPHILIZED, FOR SOLUTION INTRAMUSCULAR at 13:47

## 2023-11-10 NOTE — PATIENT INSTRUCTIONS
Please  an additional 5-day course of Macrobid therapy now.    Please start your low-dose 50 mg Macrodantin daily therapy thereafter.    Please continue your CIC use every 3-4 hours.    Please contact the clinic should you have no improvements in 2 weeks with your lower urinary tract symptoms.      Please follow-up in 3 months to discuss her lower urinary tract symptoms.    [phone]

## 2023-11-10 NOTE — PROGRESS NOTES
Subjective   Patient ID: Coby Bonner is a 73 y.o. female who presents for intradetrusor Botox.  HPI   73-year-old with chronic urinary tract infections, urinary incontinence, neurogenic bladder with longstanding history of MS having undergone InterStim stage II 10/19/2022 at the [facility] and 200 units of Botox 06/26/2023 with active urinary tract infection presenting for 200 units intradetrusor Botox.     She has no other complaints.  She has completed 7-day course of her Macrobid and has noted improvements in her UTI symptoms.  She presents today for Botox.    From Previous note    73-year-old with chronic urinary tract infections, urinary incontinence, neurogenic bladder with longstanding history of MS having undergone InterStim stage II 10/19/2022 at the [facility] and 200 units of Botox 06/26/2023 with a history of urinary tract infection. She presents today 10/30/2023 for repeat 200 units of Botox     Unfortunately the patient has an active infection today, urinalysis is grossly infected culture. She is currently utilizing Amoxicillin for a tooth abscess. She denies utilizing the pyridium. Her last UTI from 10/02/2023 was appropriately treated with Bactrim.      She has no other complaints.     From Previous note  72-year-old with chronic urinary tract infections, urinary incontinence, neurogenic bladder with longstanding history of MS having undergone InterStim stage II 10/19/2022 at the [facility] and 200 units of Botox 06/26/2023 presenting for 200 units of Botox today 10/02/2023.         The patient  appears to be having UTI like symptoms, she will be rescheduled at her earliest convenience.      She has no other complaints.        From Previous note  72-year-old with chronic urinary tract infections, urinary incontinence, neurogenic bladder with longstanding history of MS having undergone InterStim stage II 10/19/2022 at the [facility] and 200  units of Botox 06/26/2023.      She endorses relief from Botox injections. She reports experiencing leakage when she needs to urinate. She uses her catheter at night and voids every 3 hours. Her Interstim is program 4 and 1.8 . She notes leaking when she waits greater than 4 hours. She has noted terminal incontinence with her first void of the morning.     She is overall very satisfied with her present therapy.     From previous note  72-year-old with chronic urinary tract infections, urinary incontinence, neurogenic bladder with longstanding history of MS having undergone InterStim stage II 10/19/2022 at the [facility] and 200 units of Botox today 06/26/2023.       She has no other complaints.      From previous note  72-year-old with chronic urinary tract infections, urinary incontinence, neurogenic bladder with longstanding history of MS having undergone InterStim stage II 10/19/2022 at the [facility].     The patient was last seen in November 2022. continues to be overall satisfied with her InterStim and CIC use. She has not made any adjustments to her InterStim device. She is utilizing her CIC every 2-3 hours, 5-7 times daily. However she does continue to notice incontinence complaints. She notes 1-2 episodes of nocturia., she states she empties her bladder before going to bed but notes urgency after 6 hours and has to rush to the bathroom and is particularly bothered by it. She denies any UTI like symptoms.      She complains of constipation, but denies fecal or flatal incontinence.     She denies any vaginal complaints, no abnormal vaginal bleeding or discharge.      She has no other complaints.         From previous note  72-year-old with chronic urinary tract infections, urinary incontinence, neurogenic bladder with longstanding history of MS having undergone InterStim stage II on 10/19/2022.     The patient reports she is over all very satisfied with Interstim Stage II. She  "reports improvement in her bladder symptoms. She denies any urinary incontinence.     She complains of constipation, but denies fecal or flatal incontinence.     She denies any vaginal complaints, no abnormal vaginal bleeding or discharge.      She has no other complaints.         From previous note  71-year-old with chronic urinary tract infections, urinary incontinence, neurogenic bladder with longstanding history of MS.     The patient has increased her CIC use to roughly every 2-3 hours. She is emptying her bladder of between 5 and 8 ounces of urine. However even with this she continues to note urgency and urge related incontinence in between these catheterizations. She continues to note bothersome nighttime urgency and incontinence as well.     She denies any UTI-like symptoms today.     She has no other complaints.     From previous note  71-year-old presenting as a referral from Dr. Lopez with longstanding history of multiple sclerosis and neurogenic bladder with recurrent urinary tract infections.     The patient presents today with her . She utilizes CIC 3 times a day at 10:00, 4:00 and 10:00. Outside of these episodes she notes leaking. She does have near nightly enuresis as well. She has been treated on multiple occasions with 300 units Botox. Patient was most recently treated in May 2022. She stated that she had \"a few weeks of relief\" before her symptoms returned. She does believe that she has a urinary tract infection. . She could not provide a urine sample today. PVR is 84. She does not have any catheter supplies with her and would rather provide a sample tomorrow when she is visiting her primary care physician.     She otherwise denies any abnormal vaginal bleeding or discharge. She does not desire a pelvic exam today. She is not sexually active. She does have defecatory urgency.     She has no other complaints.   Review of Systems    Objective   Physical Exam  PHYSICAL EXAMINATION:  No LMP " recorded.  Body mass index is 22.81 kg/m².  /59   Pulse 86   Wt 60.3 kg (132 lb 14.4 oz)   BMI 22.81 kg/m²   General Appearance: well appearing  Neuro: Alert and oriented   HEENT: mucous membranes moist, neck supple  Resp: No respiratory distress, normal work of breathing    After consent was signed and placed in the chart the patient was prepped with iodine and lidocaine.  The flexible cystoscope was then introduced into the bladder.  There was a small area of erythema at the posterior bladder wall consistent with chronic CIC use.  There was no active bleeding or other bladder wall abnormalities.  Ureteral orifices in the normal orthotopic position.  The bladder wall was then injected at 5 sites with a Olympus needle at 4 mm depth using 2 cc aliquots of a 200 units Botox mixed into 10 cc of preservative-free normal saline.  There was no bleeding in the bladder was emptied.  Patient tolerated the procedure well.    Assessment/Plan       73-year-old with chronic urinary tract infections, urinary incontinence, neurogenic bladder with longstanding history of MS having undergone InterStim stage II 10/19/2022 at the [facility] and 200 units of Botox 06/26/2023 and today 11/10/2023.     #1 the patient has done well with her combined Botox and InterStim therapy in the past but unfortunately presents today with an active urinary tract infection. The patient is overall very satisfied with her combined Botox and InterStim stage II device. She is presently on program 4 at 1.8 V. She has noted significant improvements in her urinary incontinence complaints. She has previously utilized 300 units Botox with Dr. Lopez with minimal long-term benefit. She continues to utilize her CIC roughly every 3 hours. She will continue her vaginal estrogen therapy. We discussed the importance of appropriate vaginal hygiene and not utilizing any soaps or irritants. She has had no bladder abnormalities noted on  cystoscopic evaluation. We discussed the importance of timed voiding every 3-4 hours with her CIC.  Uncomplicated 200 units Botox today 11/10/2023.  She was provided a 5-day course of additional Macrobid therapy.  She will continue her low-dose daily Macrodantin thereafter.     #2 we discussed her bowel related complaints. We discussed potential for MiraLAX therapy instead of her milk of magnesia. We discussed the importance of fiber therapy. We discussed titrating her fiber and laxative use to a soft bowel movement every day to 2 days.  She has noted some worsening loose stool.  We discussed decreasing her MiraLAX and continuing her fiber.     #3 the patient will follow-up in 2 weeks should she have no improvements in her lower urinary tract symptoms.  She will otherwise follow-up in 3 months to discuss her bladder complaints.     FEDERICO Hays MD     Scribe Attestation  By signing my name below, ILinda Scribe attest that this documentation has been prepared under the direction and in the presence of Goyo Hays MD. All medical record entries made by the Scribe were at my direction or personally dictated by me. I have reviewed the chart and agree that the record accurately reflects my personal performance of the history, physical exam, discussion and plan.

## 2023-11-16 RX ORDER — ROSUVASTATIN CALCIUM 10 MG/1
10 TABLET, COATED ORAL DAILY
Qty: 90 TABLET | Refills: 0 | Status: SHIPPED | OUTPATIENT
Start: 2023-11-16 | End: 2024-01-15 | Stop reason: SDUPTHER

## 2023-12-12 ENCOUNTER — OFFICE VISIT (OUTPATIENT)
Dept: PRIMARY CARE | Facility: CLINIC | Age: 73
End: 2023-12-12
Payer: MEDICARE

## 2023-12-12 DIAGNOSIS — Z23 FLU VACCINE NEED: Primary | ICD-10-CM

## 2023-12-12 PROCEDURE — 90662 IIV NO PRSV INCREASED AG IM: CPT | Performed by: STUDENT IN AN ORGANIZED HEALTH CARE EDUCATION/TRAINING PROGRAM

## 2023-12-12 PROCEDURE — G0008 ADMIN INFLUENZA VIRUS VAC: HCPCS | Performed by: STUDENT IN AN ORGANIZED HEALTH CARE EDUCATION/TRAINING PROGRAM

## 2023-12-12 PROCEDURE — 1160F RVW MEDS BY RX/DR IN RCRD: CPT | Performed by: STUDENT IN AN ORGANIZED HEALTH CARE EDUCATION/TRAINING PROGRAM

## 2023-12-12 PROCEDURE — 1036F TOBACCO NON-USER: CPT | Performed by: STUDENT IN AN ORGANIZED HEALTH CARE EDUCATION/TRAINING PROGRAM

## 2023-12-12 PROCEDURE — 1159F MED LIST DOCD IN RCRD: CPT | Performed by: STUDENT IN AN ORGANIZED HEALTH CARE EDUCATION/TRAINING PROGRAM

## 2023-12-12 PROCEDURE — 1126F AMNT PAIN NOTED NONE PRSNT: CPT | Performed by: STUDENT IN AN ORGANIZED HEALTH CARE EDUCATION/TRAINING PROGRAM

## 2023-12-12 NOTE — PROGRESS NOTES
Subjective   Patient ID: Coby Bonner is a 73 y.o. female who presents for vaccination update(s)    No Known Allergies    Immunization History   Administered Date(s) Administered    Flu vaccine, quadrivalent, high-dose, preservative free, age 65y+ (FLUZONE) 12/12/2023    Influenza, seasonal, injectable 11/01/2022    Pfizer COVID-19 vaccine, bivalent, age 12 years and older (30 mcg/0.3 mL) 11/01/2022    Pfizer Purple Cap SARS-CoV-2 03/11/2021, 04/01/2021, 10/10/2021    Pneumococcal conjugate vaccine, 13-valent (PREVNAR 13) 12/01/2016    Pneumococcal polysaccharide vaccine, 23-valent, age 2 years and older (PNEUMOVAX 23) 09/01/2020, 09/17/2020    Tdap vaccine, age 7 year and older (BOOSTRIX) 03/15/2022    Zoster vaccine, recombinant, adult (SHINGRIX) 01/01/2018, 08/08/2018, 10/30/2018       Assessment/Plan   Problem List Items Addressed This Visit    None  Visit Diagnoses       Flu vaccine need    -  Primary    Relevant Orders    Flu vaccine, quadrivalent, high-dose, preservative free, age 65y+ (FLUZONE) (Completed)           All questions were answered and you were counseled on the particular immunization(s) provided today by Winsome Dhillon MA

## 2023-12-14 ENCOUNTER — TELEPHONE (OUTPATIENT)
Dept: NEUROLOGY | Facility: CLINIC | Age: 73
End: 2023-12-14

## 2023-12-14 DIAGNOSIS — R51.9 CHRONIC DAILY HEADACHE: Primary | ICD-10-CM

## 2023-12-14 RX ORDER — NORTRIPTYLINE HYDROCHLORIDE 10 MG/1
20 CAPSULE ORAL NIGHTLY
Qty: 60 CAPSULE | Refills: 5 | Status: SHIPPED | OUTPATIENT
Start: 2023-12-14 | End: 2024-06-03

## 2023-12-14 NOTE — TELEPHONE ENCOUNTER
Patient called for a refill of her nortriptyline, medication renewed and sent to the patient's local pharmacy.

## 2024-01-10 ENCOUNTER — TELEPHONE (OUTPATIENT)
Dept: UROLOGY | Facility: CLINIC | Age: 74
End: 2024-01-10
Payer: MEDICARE

## 2024-01-10 DIAGNOSIS — N95.2 ATROPHIC VAGINITIS: Primary | ICD-10-CM

## 2024-01-10 RX ORDER — ESTRADIOL 0.1 MG/G
CREAM VAGINAL
Qty: 42.5 G | Refills: 3 | Status: SHIPPED | OUTPATIENT
Start: 2024-01-10

## 2024-01-10 NOTE — TELEPHONE ENCOUNTER
Pt is requesting a refill on estradiol (Estrace) 0.01 %   Please send to [facility]/pharmacy #[id number]

## 2024-01-14 NOTE — PROGRESS NOTES
Subjective   Reason for Visit: Coby Bonner is an 73 y.o. female here for a Medicare Wellness visit.          Reviewed all medications by prescribing practitioner or clinical pharmacist (such as prescriptions, OTCs, herbal therapies and supplements) and documented in the medical record.    [facility]  Health Maintenance/[facility]  Overall patient is doing well with some concerns.  Shingrix: UTD PCV13:12/2016 PPSV23 9/2020, Flu vaccine UTD, RSV UTD  COVID-19 vaccine 4 doses of pfizer, up-to-date with recent booster  Mammogram complete 01/2023 for annual repeat, needs new referral.  Last colonoscopy ten years ago, [facility] in 04/2023 was negative with 3 year-clearance. Denies family history of colon cancer.   Reports a healthy, balanced diet and limited activity due to MS.   Denies tobacco or etoh use.     2. Hyperlipidemia  Currently taking rosuvastatin 10 mg daily. Requesting refills.   Last lipid panel in March 2023 within normal limits.  CT Cardiac Calcium score completed December 2020 with score of 523.13.  Follows with Dr. Weller    3. Eustachian Tube Dysfunction   Uses fluticasone spray daily. Requesting refills.  Reports feels stable on this medication.    4.Functional quadriplegia secondary to multiple sclerosis  Follows with neurology, Dr. Segovia.  Reports feeling stable currently.   Continues to take Baclofen, Nortriptyline, Primidone     5. Recurrent Urinary Tract Infections/ Hyperactive neurogenic bladder  Follows with urology for frequent UTIs and neurogenic bladder.  Self-caths 6 times daily.   Reporting two days left on current Nitrofurantoin course for UTI and reports feeling back to baseline with no current odor.     6. Dermatitis   Mildly pruritic, erythematous plaque on patient's right dorsal wrist that has been present for months.  Has attempted to treat for past month at home with hydrocortisone and neosporin without relief.  Denies tenderness, limited range of motion in wrist, swelling of joint, fevers.    Was previously treated for dermatitis elsewhere with triamcinolone.   Previously saw derm for basal cell removal.     7. Insomnia  Complaining insomnia for months.   Denies difficulty falling asleep, but will awaken in the night with the urge to urinate and is unable to fall asleep afterwards.  Reports ~4 hours of sleep nightly.     8. Acquired autoimmune hypothyroidism    Has a history of elevated TSH of 4.04 in October of 2020   Repeat blood work on 03/2022 showed levels back to a normal range   Denies any signs of symptoms of hypothyroidism     9. Vitamin B12 Deficiency  Last Vitamin B12 level was elevated 3315 in 03/2023  Was instructed to gradually lower the dose of B12 supplement  Currently taking B12 supplements daily.     10. Vitamin D Deficiency   Last Vitamin D level was normal in 03/2023  Is currently taking Vitamin D tablets   Patient Self Assessment of Health Status  Patient Self Assessment: Good    Nutrition and Exercise  Current Diet: Heart Healthy Diet  Adequate Fluid Intake: No  Caffeine: Yes  Exercise Frequency: Infrequently    Functional Ability/Level of Safety  Cognitive Impairment Observed: No cognitive impairment observed    Home Safety Risk Factors: None    Patient Care Team:  Ante T Pletikosic, DO as PCP - General  Ante T Pletikosic, DO as PCP - [facility] Attributed Provider       Review of Systems   All other systems reviewed and are negative.    Objective   Vitals:  /60 (BP Location: Left arm, Patient Position: Sitting, BP Cuff Size: Adult)   Pulse 88   Wt 59 kg (130 lb)   BMI 22.31 kg/m²       Physical Exam    CONSTITUTIONAL - well nourished, well developed, looks like stated age, in no acute distress, not ill-appearing, and not tired appearing  SKIN - dry skin with slight rash on right dorsal wrist  HEAD - no trauma, normocephalic  EYES - extraocular muscles are intact, and normal external exam  ENT -  uvula midline, normal tongue movement and throat normal, no exudate  NECK -  supple without rigidity, no neck mass was observed, no thyromegaly or thyroid nodules  CHEST - clear to auscultation, no wheezing, no crackles and no rales, good effort  CARDIAC - regular rate and regular rhythm, no skipped beats, no murmur  ABDOMEN - no organomegaly, soft, nontender, nondistended, no guarding/rebound/rigidity,   EXTREMITIES - +1 bilateral pitting edema, no deformities, mild scoliosis present  NEUROLOGICAL - abnormal/slow gait, essential tremor, irregular and abnormal balance, uses a walker   PSYCHIATRIC - alert, pleasant and cordial, age-appropriate  IMMUNOLOGIC - no cervical lymphadenopathy      Assessment/Plan     Health Maintenance/[facility]  A complete history and physical were performed today.  Requisition for CBC, CMP, lipid panel, TSH, A1c and vitamin B-12 level was provided today  We will review test results once available and make recommendation accordingly  Requisition for mammogram and DEXA scan was provided today.  Please call to schedule appointment at your earliest convenience  Please continue to attempt to eat a healthy, balanced diet and stay active.    Hyperlipidemia  Continue taking rosuvastatin 10 mg daily.  Refill have been sent to your pharmacy.  We ordered blood work today.  We will notify you of the results and make treatment recommendations accordingly.   Continue to follow with Dr. Weller as needed.    Eustachian Tube Dysfunction  Use fluticasone spray daily.   Refills have been sent to your pharmacy.    Functional quadriplegia secondary to multiple sclerosis  Continue to follow with neurology Dr. Segovia.   Continue to take Baclofen, Nortriptyline, Primidone as he prescribes.    Recurrent Urinary Tract Infections/ Hyperactive neurogenic bladder  Continue to follow with urology.  Stable, no changes in recommendations.    Dermatitis   Continue to follow with dermatology or our office as needed  No further recommendations at this time      Insomnia  I'm sorry to hear you are having  difficulty with sleep.  Please follow up with your neurologist for treatment recommendations regarding this.     Acquired autoimmune hypothyroidism  We ordered blood work today.  We will notify you of the results and make treatment recommendations accordingly.     Vitamin B12 Deficiency  We ordered blood work today.  We will notify you of the results and make treatment recommendations accordingly.   Continue to take your Vitamin B12 supplement.    Vitamin D Deficiency   We ordered blood work today.  We will notify you of the results and make treatment recommendations accordingly.   Continue to take your Vitamin D supplement.    Thank you for letting us be a part of your care team.  Please call the office if you have further questions or concerns regarding your care    Otherwise, please follow-up in 6 months for continued care and refills    Cortez Madrid MD  PGY1 FM Resident

## 2024-01-15 ENCOUNTER — LAB (OUTPATIENT)
Dept: LAB | Facility: LAB | Age: 74
End: 2024-01-15
Payer: MEDICARE

## 2024-01-15 ENCOUNTER — OFFICE VISIT (OUTPATIENT)
Dept: PRIMARY CARE | Facility: CLINIC | Age: 74
End: 2024-01-15
Payer: MEDICARE

## 2024-01-15 ENCOUNTER — APPOINTMENT (OUTPATIENT)
Dept: PRIMARY CARE | Facility: CLINIC | Age: 74
End: 2024-01-15
Payer: MEDICARE

## 2024-01-15 VITALS
SYSTOLIC BLOOD PRESSURE: 110 MMHG | DIASTOLIC BLOOD PRESSURE: 60 MMHG | BODY MASS INDEX: 22.31 KG/M2 | HEART RATE: 88 BPM | WEIGHT: 130 LBS

## 2024-01-15 DIAGNOSIS — Z12.31 ENCOUNTER FOR SCREENING MAMMOGRAM FOR MALIGNANT NEOPLASM OF BREAST: ICD-10-CM

## 2024-01-15 DIAGNOSIS — E46 PROTEIN-CALORIE MALNUTRITION, UNSPECIFIED SEVERITY (MULTI): ICD-10-CM

## 2024-01-15 DIAGNOSIS — Z00.00 ENCOUNTER FOR MEDICARE ANNUAL WELLNESS EXAM: ICD-10-CM

## 2024-01-15 DIAGNOSIS — E55.9 VITAMIN D DEFICIENCY: ICD-10-CM

## 2024-01-15 DIAGNOSIS — L30.9 DERMATITIS: ICD-10-CM

## 2024-01-15 DIAGNOSIS — Z78.0 POST-MENOPAUSAL: ICD-10-CM

## 2024-01-15 DIAGNOSIS — H69.93 DYSFUNCTION OF BOTH EUSTACHIAN TUBES: ICD-10-CM

## 2024-01-15 DIAGNOSIS — E06.3 ACQUIRED AUTOIMMUNE HYPOTHYROIDISM: ICD-10-CM

## 2024-01-15 DIAGNOSIS — E53.8 B12 DEFICIENCY: ICD-10-CM

## 2024-01-15 DIAGNOSIS — R73.9 HYPERGLYCEMIA: ICD-10-CM

## 2024-01-15 DIAGNOSIS — Z00.00 HEALTHCARE MAINTENANCE: ICD-10-CM

## 2024-01-15 DIAGNOSIS — G35 MULTIPLE SCLEROSIS (MULTI): ICD-10-CM

## 2024-01-15 DIAGNOSIS — R53.2: ICD-10-CM

## 2024-01-15 DIAGNOSIS — E78.5 HYPERLIPIDEMIA, UNSPECIFIED HYPERLIPIDEMIA TYPE: ICD-10-CM

## 2024-01-15 DIAGNOSIS — G35: ICD-10-CM

## 2024-01-15 DIAGNOSIS — G47.00 INSOMNIA, UNSPECIFIED TYPE: ICD-10-CM

## 2024-01-15 DIAGNOSIS — Z00.00 ENCOUNTER FOR MEDICARE ANNUAL WELLNESS EXAM: Primary | ICD-10-CM

## 2024-01-15 LAB
25(OH)D3 SERPL-MCNC: 59 NG/ML (ref 30–100)
ALBUMIN SERPL BCP-MCNC: 4.1 G/DL (ref 3.4–5)
ALP SERPL-CCNC: 71 U/L (ref 33–136)
ALT SERPL W P-5'-P-CCNC: 13 U/L (ref 7–45)
ANION GAP SERPL CALC-SCNC: 9 MMOL/L (ref 10–20)
AST SERPL W P-5'-P-CCNC: 22 U/L (ref 9–39)
BASOPHILS # BLD AUTO: 0.05 X10*3/UL (ref 0–0.1)
BASOPHILS NFR BLD AUTO: 1.7 %
BILIRUB SERPL-MCNC: 0.5 MG/DL (ref 0–1.2)
BUN SERPL-MCNC: 13 MG/DL (ref 6–23)
CALCIUM SERPL-MCNC: 9.5 MG/DL (ref 8.6–10.3)
CHLORIDE SERPL-SCNC: 104 MMOL/L (ref 98–107)
CHOLEST SERPL-MCNC: 176 MG/DL (ref 0–199)
CHOLESTEROL/HDL RATIO: 1.9
CO2 SERPL-SCNC: 30 MMOL/L (ref 21–32)
CREAT SERPL-MCNC: 0.81 MG/DL (ref 0.5–1.05)
EGFRCR SERPLBLD CKD-EPI 2021: 77 ML/MIN/1.73M*2
EOSINOPHIL # BLD AUTO: 0.09 X10*3/UL (ref 0–0.4)
EOSINOPHIL NFR BLD AUTO: 3 %
ERYTHROCYTE [DISTWIDTH] IN BLOOD BY AUTOMATED COUNT: 12.4 % (ref 11.5–14.5)
GLUCOSE SERPL-MCNC: 83 MG/DL (ref 74–99)
HCT VFR BLD AUTO: 41.2 % (ref 36–46)
HDLC SERPL-MCNC: 90.7 MG/DL
HGB BLD-MCNC: 13.1 G/DL (ref 12–16)
IMM GRANULOCYTES # BLD AUTO: 0 X10*3/UL (ref 0–0.5)
IMM GRANULOCYTES NFR BLD AUTO: 0 % (ref 0–0.9)
LDLC SERPL CALC-MCNC: 74 MG/DL
LYMPHOCYTES # BLD AUTO: 1.03 X10*3/UL (ref 0.8–3)
LYMPHOCYTES NFR BLD AUTO: 34.7 %
MCH RBC QN AUTO: 31.5 PG (ref 26–34)
MCHC RBC AUTO-ENTMCNC: 31.8 G/DL (ref 32–36)
MCV RBC AUTO: 99 FL (ref 80–100)
MONOCYTES # BLD AUTO: 0.33 X10*3/UL (ref 0.05–0.8)
MONOCYTES NFR BLD AUTO: 11.1 %
NEUTROPHILS # BLD AUTO: 1.47 X10*3/UL (ref 1.6–5.5)
NEUTROPHILS NFR BLD AUTO: 49.5 %
NON HDL CHOLESTEROL: 85 MG/DL (ref 0–149)
NRBC BLD-RTO: 0 /100 WBCS (ref 0–0)
PLATELET # BLD AUTO: 183 X10*3/UL (ref 150–450)
POTASSIUM SERPL-SCNC: 4.2 MMOL/L (ref 3.5–5.3)
PROT SERPL-MCNC: 6.7 G/DL (ref 6.4–8.2)
RBC # BLD AUTO: 4.16 X10*6/UL (ref 4–5.2)
SODIUM SERPL-SCNC: 139 MMOL/L (ref 136–145)
TRIGL SERPL-MCNC: 55 MG/DL (ref 0–149)
TSH SERPL-ACNC: 1.38 MIU/L (ref 0.44–3.98)
VIT B12 SERPL-MCNC: 366 PG/ML (ref 211–911)
VLDL: 11 MG/DL (ref 0–40)
WBC # BLD AUTO: 3 X10*3/UL (ref 4.4–11.3)

## 2024-01-15 PROCEDURE — 1170F FXNL STATUS ASSESSED: CPT | Performed by: STUDENT IN AN ORGANIZED HEALTH CARE EDUCATION/TRAINING PROGRAM

## 2024-01-15 PROCEDURE — 1036F TOBACCO NON-USER: CPT | Performed by: STUDENT IN AN ORGANIZED HEALTH CARE EDUCATION/TRAINING PROGRAM

## 2024-01-15 PROCEDURE — 36415 COLL VENOUS BLD VENIPUNCTURE: CPT

## 2024-01-15 PROCEDURE — 80061 LIPID PANEL: CPT

## 2024-01-15 PROCEDURE — 82607 VITAMIN B-12: CPT

## 2024-01-15 PROCEDURE — 1159F MED LIST DOCD IN RCRD: CPT | Performed by: STUDENT IN AN ORGANIZED HEALTH CARE EDUCATION/TRAINING PROGRAM

## 2024-01-15 PROCEDURE — 82306 VITAMIN D 25 HYDROXY: CPT

## 2024-01-15 PROCEDURE — 83036 HEMOGLOBIN GLYCOSYLATED A1C: CPT

## 2024-01-15 PROCEDURE — 84443 ASSAY THYROID STIM HORMONE: CPT

## 2024-01-15 PROCEDURE — 1126F AMNT PAIN NOTED NONE PRSNT: CPT | Performed by: STUDENT IN AN ORGANIZED HEALTH CARE EDUCATION/TRAINING PROGRAM

## 2024-01-15 PROCEDURE — 85025 COMPLETE CBC W/AUTO DIFF WBC: CPT

## 2024-01-15 PROCEDURE — 99215 OFFICE O/P EST HI 40 MIN: CPT | Performed by: STUDENT IN AN ORGANIZED HEALTH CARE EDUCATION/TRAINING PROGRAM

## 2024-01-15 PROCEDURE — 80053 COMPREHEN METABOLIC PANEL: CPT

## 2024-01-15 RX ORDER — ROSUVASTATIN CALCIUM 10 MG/1
10 TABLET, COATED ORAL DAILY
Qty: 90 TABLET | Refills: 1 | Status: SHIPPED | OUTPATIENT
Start: 2024-01-15 | End: 2024-04-15 | Stop reason: SDUPTHER

## 2024-01-15 RX ORDER — TRIAMCINOLONE ACETONIDE 1 MG/G
OINTMENT TOPICAL 2 TIMES DAILY
Qty: 80 G | Refills: 1 | Status: SHIPPED | OUTPATIENT
Start: 2024-01-15 | End: 2024-04-15 | Stop reason: SDUPTHER

## 2024-01-15 RX ORDER — FLUTICASONE PROPIONATE 50 MCG
2 SPRAY, SUSPENSION (ML) NASAL DAILY
Qty: 16 ML | Refills: 2 | Status: SHIPPED | OUTPATIENT
Start: 2024-01-15 | End: 2024-04-14

## 2024-01-15 ASSESSMENT — ENCOUNTER SYMPTOMS
DEPRESSION: 0
OCCASIONAL FEELINGS OF UNSTEADINESS: 1
LOSS OF SENSATION IN FEET: 0

## 2024-01-15 ASSESSMENT — ACTIVITIES OF DAILY LIVING (ADL)
MANAGING_FINANCES: INDEPENDENT
DRESSING: INDEPENDENT
BATHING: INDEPENDENT
GROCERY_SHOPPING: INDEPENDENT
DOING_HOUSEWORK: INDEPENDENT
TAKING_MEDICATION: INDEPENDENT

## 2024-01-15 ASSESSMENT — PATIENT HEALTH QUESTIONNAIRE - PHQ9
SUM OF ALL RESPONSES TO PHQ9 QUESTIONS 1 AND 2: 0
2. FEELING DOWN, DEPRESSED OR HOPELESS: NOT AT ALL
1. LITTLE INTEREST OR PLEASURE IN DOING THINGS: NOT AT ALL

## 2024-01-16 LAB
EST. AVERAGE GLUCOSE BLD GHB EST-MCNC: 108 MG/DL
HBA1C MFR BLD: 5.4 %

## 2024-01-16 NOTE — RESULT ENCOUNTER NOTE
Sugar, kidneys, liver, electrodes are all within normal limits    Complete blood cell count continues show a lower white blood cell count at 3.0 but this has been one of the best on file    No signs of any anemia  Neutrophils still slightly low but this is overall stable    Vitamin D well within normal limits at 59 as well as a normal B12 at 366    Thyroid well within normal limits    Cholesterol looks stable at 176, HDL 90, LDL 74, triglycerides 55    We are just waiting for the hemoglobin A1c at this time

## 2024-01-24 ENCOUNTER — HOSPITAL ENCOUNTER (OUTPATIENT)
Dept: RADIOLOGY | Facility: CLINIC | Age: 74
Discharge: HOME | End: 2024-01-24
Payer: MEDICARE

## 2024-01-24 VITALS — WEIGHT: 130.07 LBS | BODY MASS INDEX: 22.21 KG/M2 | HEIGHT: 64 IN

## 2024-01-24 DIAGNOSIS — Z12.31 ENCOUNTER FOR SCREENING MAMMOGRAM FOR MALIGNANT NEOPLASM OF BREAST: ICD-10-CM

## 2024-01-24 PROCEDURE — 77067 SCR MAMMO BI INCL CAD: CPT | Performed by: STUDENT IN AN ORGANIZED HEALTH CARE EDUCATION/TRAINING PROGRAM

## 2024-01-24 PROCEDURE — 77067 SCR MAMMO BI INCL CAD: CPT

## 2024-01-24 PROCEDURE — 77063 BREAST TOMOSYNTHESIS BI: CPT | Performed by: STUDENT IN AN ORGANIZED HEALTH CARE EDUCATION/TRAINING PROGRAM

## 2024-01-28 DIAGNOSIS — R92.8 ABNORMAL MAMMOGRAM OF LEFT BREAST: Primary | ICD-10-CM

## 2024-01-28 NOTE — RESULT ENCOUNTER NOTE
Mammogram within the right breast is completely normal    Left breast does show a slight asymmetry and is recommended that we do an ultrasound as well as a diagnostic mammogram    To be fully thorough I ordered this to be done at her earliest mutual convenience of the left breast

## 2024-01-29 ENCOUNTER — HOSPITAL ENCOUNTER (OUTPATIENT)
Dept: RADIOLOGY | Facility: CLINIC | Age: 74
Discharge: HOME | End: 2024-01-29
Payer: MEDICARE

## 2024-01-29 DIAGNOSIS — R92.8 ABNORMAL MAMMOGRAM OF LEFT BREAST: ICD-10-CM

## 2024-01-29 PROCEDURE — 77065 DX MAMMO INCL CAD UNI: CPT | Mod: LEFT SIDE | Performed by: STUDENT IN AN ORGANIZED HEALTH CARE EDUCATION/TRAINING PROGRAM

## 2024-01-29 PROCEDURE — G0279 TOMOSYNTHESIS, MAMMO: HCPCS | Mod: LEFT SIDE | Performed by: STUDENT IN AN ORGANIZED HEALTH CARE EDUCATION/TRAINING PROGRAM

## 2024-01-29 PROCEDURE — 77061 BREAST TOMOSYNTHESIS UNI: CPT | Mod: LT

## 2024-01-30 ENCOUNTER — HOSPITAL ENCOUNTER (OUTPATIENT)
Dept: RADIOLOGY | Facility: CLINIC | Age: 74
Discharge: HOME | End: 2024-01-30
Payer: MEDICARE

## 2024-01-30 DIAGNOSIS — Z78.0 POST-MENOPAUSAL: ICD-10-CM

## 2024-01-30 PROCEDURE — 77080 DXA BONE DENSITY AXIAL: CPT

## 2024-01-30 PROCEDURE — 77080 DXA BONE DENSITY AXIAL: CPT | Performed by: RADIOLOGY

## 2024-01-30 NOTE — RESULT ENCOUNTER NOTE
Mammogram showed a BI-RADS Category 2, benign    The finding was most likely of a benign fibroglandular tissue    No suspicious masses noted    Will go back to yearly screenings

## 2024-01-31 NOTE — RESULT ENCOUNTER NOTE
DEXA scan does show osteoporosis    Due to this, the patient has a few choices    She can try to just do the calcium and vitamin D and we can follow-up in 2 years or even we can discuss medication such as Fosamax once weekly or even a Prolia injection once every 6 months    Please advise on what the patient would like to do

## 2024-02-13 ENCOUNTER — TELEPHONE (OUTPATIENT)
Dept: UROLOGY | Facility: CLINIC | Age: 74
End: 2024-02-13

## 2024-02-16 ENCOUNTER — APPOINTMENT (OUTPATIENT)
Dept: UROLOGY | Facility: CLINIC | Age: 74
End: 2024-02-16
Payer: MEDICARE

## 2024-02-19 ENCOUNTER — OFFICE VISIT (OUTPATIENT)
Dept: UROLOGY | Facility: CLINIC | Age: 74
End: 2024-02-19
Payer: MEDICARE

## 2024-02-19 VITALS — TEMPERATURE: 96.6 F | BODY MASS INDEX: 22.2 KG/M2 | WEIGHT: 130 LBS | HEIGHT: 64 IN

## 2024-02-19 DIAGNOSIS — K59.01 SLOW TRANSIT CONSTIPATION: ICD-10-CM

## 2024-02-19 DIAGNOSIS — G35 MULTIPLE SCLEROSIS (MULTI): ICD-10-CM

## 2024-02-19 DIAGNOSIS — N39.0 RECURRENT UTI: ICD-10-CM

## 2024-02-19 DIAGNOSIS — N31.9 NEUROGENIC BLADDER: ICD-10-CM

## 2024-02-19 DIAGNOSIS — N95.2 ATROPHIC VAGINITIS: ICD-10-CM

## 2024-02-19 DIAGNOSIS — N39.41 URGE INCONTINENCE: Primary | ICD-10-CM

## 2024-02-19 PROCEDURE — 1126F AMNT PAIN NOTED NONE PRSNT: CPT | Performed by: OBSTETRICS & GYNECOLOGY

## 2024-02-19 PROCEDURE — 1036F TOBACCO NON-USER: CPT | Performed by: OBSTETRICS & GYNECOLOGY

## 2024-02-19 PROCEDURE — 99213 OFFICE O/P EST LOW 20 MIN: CPT | Performed by: OBSTETRICS & GYNECOLOGY

## 2024-02-19 PROCEDURE — 1159F MED LIST DOCD IN RCRD: CPT | Performed by: OBSTETRICS & GYNECOLOGY

## 2024-02-19 ASSESSMENT — PAIN SCALES - GENERAL: PAINLEVEL: 0-NO PAIN

## 2024-02-19 NOTE — PATIENT INSTRUCTIONS
Please drop off a urine sample at your earliest convenience.  You have a standing order for urinalysis and urine culture available at any [facility] lab.  You will be contacted with these results should you require therapy.    You will be contacted for rescheduling your 200 units Botox at the [place] office as soon as your insurance company approves the medication.    Please continue your CIC use every 3-4 hours.    Please contact the clinic with any questions or concerns    [phone]

## 2024-02-19 NOTE — PROGRESS NOTES
Subjective   Patient ID: Coby Bonner is a 73 y.o. female who presents for intradetrusor Botox.  HPI  73-year-old with chronic urinary tract infections, urinary incontinence, neurogenic bladder with longstanding history of MS having undergone InterStim stage II 10/19/2022 at the [facility] and 200 units of Botox 06/26/2023 and 11/10/2023.    The patient presents for Botox today, however she is not authorized for this. She will be authorized and rescheduled at her earliest convenience. She is noting worsening urgency and incontinence complaints. She denies any UTI like symptoms.     She denies any vaginal complaints, no abnormal bleeding or discharge.     She denies any bowel related complaints, no fecal or flatal incontinence.    She has no other complaints.      From Previous note   73-year-old with chronic urinary tract infections, urinary incontinence, neurogenic bladder with longstanding history of MS having undergone InterStim stage II 10/19/2022 at the [facility] and 200 units of Botox 06/26/2023 with active urinary tract infection presenting for 200 units intradetrusor Botox.     She has no other complaints.  She has completed 7-day course of her Macrobid and has noted improvements in her UTI symptoms.  She presents today for Botox.    From Previous note    73-year-old with chronic urinary tract infections, urinary incontinence, neurogenic bladder with longstanding history of MS having undergone InterStim stage II 10/19/2022 at the [facility] and 200 units of Botox 06/26/2023 with a history of urinary tract infection. She presents today 10/30/2023 for repeat 200 units of Botox     Unfortunately the patient has an active infection today, urinalysis is grossly infected culture. She is currently utilizing Amoxicillin for a tooth abscess. She denies utilizing the pyridium. Her last UTI from 10/02/2023 was appropriately treated with Bactrim.      She has no other  complaints.     From Previous note  72-year-old with chronic urinary tract infections, urinary incontinence, neurogenic bladder with longstanding history of MS having undergone InterStim stage II 10/19/2022 at the [facility] and 200 units of Botox 06/26/2023 presenting for 200 units of Botox today 10/02/2023.         The patient  appears to be having UTI like symptoms, she will be rescheduled at her earliest convenience.      She has no other complaints.        From Previous note  72-year-old with chronic urinary tract infections, urinary incontinence, neurogenic bladder with longstanding history of MS having undergone InterStim stage II 10/19/2022 at the [facility] and 200 units of Botox 06/26/2023.      She endorses relief from Botox injections. She reports experiencing leakage when she needs to urinate. She uses her catheter at night and voids every 3 hours. Her Interstim is program 4 and 1.8 . She notes leaking when she waits greater than 4 hours. She has noted terminal incontinence with her first void of the morning.     She is overall very satisfied with her present therapy.     From previous note  72-year-old with chronic urinary tract infections, urinary incontinence, neurogenic bladder with longstanding history of MS having undergone InterStim stage II 10/19/2022 at the [facility] and 200 units of Botox today 06/26/2023.       She has no other complaints.      From previous note  72-year-old with chronic urinary tract infections, urinary incontinence, neurogenic bladder with longstanding history of MS having undergone InterStim stage II 10/19/2022 at the [facility].     The patient was last seen in November 2022. continues to be overall satisfied with her InterStim and CIC use. She has not made any adjustments to her InterStim device. She is utilizing her CIC every 2-3 hours, 5-7 times daily. However she does continue to notice incontinence complaints.  She notes 1-2 episodes of nocturia., she states she empties her bladder before going to bed but notes urgency after 6 hours and has to rush to the bathroom and is particularly bothered by it. She denies any UTI like symptoms.      She complains of constipation, but denies fecal or flatal incontinence.     She denies any vaginal complaints, no abnormal vaginal bleeding or discharge.      She has no other complaints.         From previous note  72-year-old with chronic urinary tract infections, urinary incontinence, neurogenic bladder with longstanding history of MS having undergone InterStim stage II on 10/19/2022.     The patient reports she is over all very satisfied with Interstim Stage II. She reports improvement in her bladder symptoms. She denies any urinary incontinence.     She complains of constipation, but denies fecal or flatal incontinence.     She denies any vaginal complaints, no abnormal vaginal bleeding or discharge.      She has no other complaints.         From previous note  71-year-old with chronic urinary tract infections, urinary incontinence, neurogenic bladder with longstanding history of MS.     The patient has increased her CIC use to roughly every 2-3 hours. She is emptying her bladder of between 5 and 8 ounces of urine. However even with this she continues to note urgency and urge related incontinence in between these catheterizations. She continues to note bothersome nighttime urgency and incontinence as well.     She denies any UTI-like symptoms today.     She has no other complaints.     From previous note  71-year-old presenting as a referral from Dr. Lopez with longstanding history of multiple sclerosis and neurogenic bladder with recurrent urinary tract infections.     The patient presents today with her . She utilizes CIC 3 times a day at 10:00, 4:00 and 10:00. Outside of these episodes she notes leaking. She does have near nightly enuresis as well. She has been treated on  "multiple occasions with 300 units Botox. Patient was most recently treated in May 2022. She stated that she had \"a few weeks of relief\" before her symptoms returned. She does believe that she has a urinary tract infection. . She could not provide a urine sample today. PVR is 84. She does not have any catheter supplies with her and would rather provide a sample tomorrow when she is visiting her primary care physician.     She otherwise denies any abnormal vaginal bleeding or discharge. She does not desire a pelvic exam today. She is not sexually active. She does have defecatory urgency.     She has no other complaints.   Review of Systems    Objective   Physical Exam  PHYSICAL EXAMINATION:  No LMP recorded.  Body mass index is 22.81 kg/m².  /59   Pulse 86   Wt 60.3 kg (132 lb 14.4 oz)   BMI 22.81 kg/m²   General Appearance: well appearing  Neuro: Alert and oriented   HEENT: mucous membranes moist, neck supple  Resp: No respiratory distress, normal work of breathing    Assessment/Plan       73-year-old with chronic urinary tract infections, urinary incontinence, neurogenic bladder with longstanding history of MS having undergone InterStim stage II 10/19/2022 at the [facility] and 200 units of Botox 06/26/2023 and 11/10/2023.     #1 the patient has done well with her combined Botox and InterStim therapy in the past. The patient is overall very satisfied with her combined Botox and InterStim stage II device. She is presently on program 4 at 1.8 V. She has noted significant improvements in her urinary incontinence complaints. She has previously utilized 300 units Botox with Dr. Lopez with minimal long-term benefit. She continues to utilize her CIC roughly every 3 hours. She will continue her vaginal estrogen therapy. We discussed the importance of appropriate vaginal hygiene and not utilizing any soaps or irritants. She has had no bladder abnormalities noted on cystoscopic evaluation. We " discussed the importance of timed voiding every 3-4 hours with her CIC.  The patient will proceed with a urine culture at her earliest convenience.  We will proceed with authorization for 200 units Botox and scheduling at her earliest convenience.     #2 we discussed her bowel related complaints. We discussed potential for MiraLAX therapy instead of her milk of magnesia. We discussed the importance of fiber therapy. We discussed titrating her fiber and laxative use to a soft bowel movement every day to 2 days.  She has noted some worsening loose stool.  We discussed decreasing her MiraLAX and continuing her fiber.     #3 the patient will follow-up for 200 units Botox at her earliest convenience after authorization.  She will be contacted with the results of her urine culture should she require therapy prior to this procedure.      FEDERICO Hays MD     Scribe Attestation  By signing my name below, ILinda, Scribe attest that this documentation has been prepared under the direction and in the presence of Goyo Hays MD. All medical record entries made by the Scribe were at my direction or personally dictated by me. I have reviewed the chart and agree that the record accurately reflects my personal performance of the history, physical exam, discussion and plan.

## 2024-02-22 ENCOUNTER — LAB (OUTPATIENT)
Dept: LAB | Facility: LAB | Age: 74
End: 2024-02-22
Payer: MEDICARE

## 2024-02-24 DIAGNOSIS — F41.9 ANXIETY DISORDER, UNSPECIFIED: ICD-10-CM

## 2024-02-28 ENCOUNTER — TELEPHONE (OUTPATIENT)
Dept: OPERATING ROOM | Facility: HOSPITAL | Age: 74
End: 2024-02-28

## 2024-02-28 ENCOUNTER — LAB (OUTPATIENT)
Dept: LAB | Facility: LAB | Age: 74
End: 2024-02-28
Payer: MEDICARE

## 2024-02-28 DIAGNOSIS — N39.0 URINARY TRACT INFECTION WITHOUT HEMATURIA, SITE UNSPECIFIED: ICD-10-CM

## 2024-02-28 DIAGNOSIS — N31.9 NEUROGENIC BLADDER: Primary | ICD-10-CM

## 2024-03-05 RX ORDER — CITALOPRAM 20 MG/1
20 TABLET, FILM COATED ORAL DAILY
Qty: 90 TABLET | Refills: 3 | Status: SHIPPED | OUTPATIENT
Start: 2024-03-05

## 2024-03-11 ENCOUNTER — APPOINTMENT (OUTPATIENT)
Dept: PRIMARY CARE | Facility: CLINIC | Age: 74
End: 2024-03-11
Payer: MEDICARE

## 2024-03-18 ENCOUNTER — OFFICE VISIT (OUTPATIENT)
Dept: UROLOGY | Facility: CLINIC | Age: 74
End: 2024-03-18
Payer: MEDICARE

## 2024-03-18 VITALS
SYSTOLIC BLOOD PRESSURE: 115 MMHG | DIASTOLIC BLOOD PRESSURE: 75 MMHG | HEIGHT: 64 IN | TEMPERATURE: 96 F | HEART RATE: 79 BPM | WEIGHT: 125 LBS | BODY MASS INDEX: 21.34 KG/M2

## 2024-03-18 DIAGNOSIS — N31.9 NEUROGENIC BLADDER: Primary | ICD-10-CM

## 2024-03-18 DIAGNOSIS — N95.2 ATROPHIC VAGINITIS: ICD-10-CM

## 2024-03-18 DIAGNOSIS — R33.9 INCOMPLETE BLADDER EMPTYING: ICD-10-CM

## 2024-03-18 DIAGNOSIS — N39.0 RECURRENT UTI: ICD-10-CM

## 2024-03-18 DIAGNOSIS — N39.41 URGE INCONTINENCE: ICD-10-CM

## 2024-03-18 DIAGNOSIS — G35 MULTIPLE SCLEROSIS (MULTI): ICD-10-CM

## 2024-03-18 DIAGNOSIS — K59.01 SLOW TRANSIT CONSTIPATION: ICD-10-CM

## 2024-03-18 PROCEDURE — 99213 OFFICE O/P EST LOW 20 MIN: CPT | Performed by: OBSTETRICS & GYNECOLOGY

## 2024-03-18 PROCEDURE — 1159F MED LIST DOCD IN RCRD: CPT | Performed by: OBSTETRICS & GYNECOLOGY

## 2024-03-18 PROCEDURE — 52287 CYSTOSCOPY CHEMODENERVATION: CPT | Performed by: OBSTETRICS & GYNECOLOGY

## 2024-03-18 PROCEDURE — 1160F RVW MEDS BY RX/DR IN RCRD: CPT | Performed by: OBSTETRICS & GYNECOLOGY

## 2024-03-18 PROCEDURE — 1036F TOBACCO NON-USER: CPT | Performed by: OBSTETRICS & GYNECOLOGY

## 2024-03-18 PROCEDURE — 1126F AMNT PAIN NOTED NONE PRSNT: CPT | Performed by: OBSTETRICS & GYNECOLOGY

## 2024-03-18 RX ORDER — NITROFURANTOIN 25; 75 MG/1; MG/1
100 CAPSULE ORAL 2 TIMES DAILY
Qty: 6 CAPSULE | Refills: 0 | Status: SHIPPED | OUTPATIENT
Start: 2024-03-18 | End: 2024-03-21

## 2024-03-18 ASSESSMENT — PAIN SCALES - GENERAL: PAINLEVEL: 0-NO PAIN

## 2024-03-18 NOTE — PATIENT INSTRUCTIONS
Please remember that you have a standing order for urinalysis and urine culture available at any [facility] lab.  You will be contacted with these results should you require therapy.    Please hold your daily Macrodantin now and start 100 mg nitrofurantoin twice daily for 3 days.  Please restart your Macrodantin thereafter.    Please continue your CIC use every 3-4 hours.    Please contact the clinic when you are ready for repeat 200 units Botox    Please contact the clinic with any questions or concerns    [phone]

## 2024-03-18 NOTE — PROGRESS NOTES
Subjective   Patient ID: Coby Bonner is a 73 y.o. female who presents for intradetrusor Botox.  HPI    73-year-old with chronic urinary tract infections, urinary incontinence, neurogenic bladder with longstanding history of MS having undergone InterStim stage II 10/19/2022 at the [facility] and 200 units of Botox 06/26/2023, 11/10/2023 and today 03/18/2024.    She denies any vaginal complaints, no abnormal bleeding or discharge.     She denies any bowel related complaints, no fecal or flatal incontinence.    She has no other complaints.    From Previous note  73-year-old with chronic urinary tract infections, urinary incontinence, neurogenic bladder with longstanding history of MS having undergone InterStim stage II 10/19/2022 at the [facility] and 200 units of Botox 06/26/2023 and 11/10/2023.    The patient presents for Botox today, however she is not authorized for this. She will be authorized and rescheduled at her earliest convenience. She is noting worsening urgency and incontinence complaints. She denies any UTI like symptoms.     She denies any vaginal complaints, no abnormal bleeding or discharge.     She denies any bowel related complaints, no fecal or flatal incontinence.    She has no other complaints.      From Previous note   73-year-old with chronic urinary tract infections, urinary incontinence, neurogenic bladder with longstanding history of MS having undergone InterStim stage II 10/19/2022 at the [facility] and 200 units of Botox 06/26/2023 with active urinary tract infection presenting for 200 units intradetrusor Botox.     She has no other complaints.  She has completed 7-day course of her Macrobid and has noted improvements in her UTI symptoms.  She presents today for Botox.    From Previous note    73-year-old with chronic urinary tract infections, urinary incontinence, neurogenic bladder with longstanding history of MS having undergone InterStim  stage II 10/19/2022 at the [facility] and 200 units of Botox 06/26/2023 with a history of urinary tract infection. She presents today 10/30/2023 for repeat 200 units of Botox     Unfortunately the patient has an active infection today, urinalysis is grossly infected culture. She is currently utilizing Amoxicillin for a tooth abscess. She denies utilizing the pyridium. Her last UTI from 10/02/2023 was appropriately treated with Bactrim.      She has no other complaints.     From Previous note  72-year-old with chronic urinary tract infections, urinary incontinence, neurogenic bladder with longstanding history of MS having undergone InterStim stage II 10/19/2022 at the [facility] and 200 units of Botox 06/26/2023 presenting for 200 units of Botox today 10/02/2023.         The patient  appears to be having UTI like symptoms, she will be rescheduled at her earliest convenience.      She has no other complaints.        From Previous note  72-year-old with chronic urinary tract infections, urinary incontinence, neurogenic bladder with longstanding history of MS having undergone InterStim stage II 10/19/2022 at the [facility] and 200 units of Botox 06/26/2023.      She endorses relief from Botox injections. She reports experiencing leakage when she needs to urinate. She uses her catheter at night and voids every 3 hours. Her Interstim is program 4 and 1.8 . She notes leaking when she waits greater than 4 hours. She has noted terminal incontinence with her first void of the morning.     She is overall very satisfied with her present therapy.     From previous note  72-year-old with chronic urinary tract infections, urinary incontinence, neurogenic bladder with longstanding history of MS having undergone InterStim stage II 10/19/2022 at the [facility] and 200 units of Botox today 06/26/2023.       She has no other complaints.      From previous note  72-year-old with  chronic urinary tract infections, urinary incontinence, neurogenic bladder with longstanding history of MS having undergone InterStim stage II 10/19/2022 at the [facility].     The patient was last seen in November 2022. continues to be overall satisfied with her InterStim and CIC use. She has not made any adjustments to her InterStim device. She is utilizing her CIC every 2-3 hours, 5-7 times daily. However she does continue to notice incontinence complaints. She notes 1-2 episodes of nocturia., she states she empties her bladder before going to bed but notes urgency after 6 hours and has to rush to the bathroom and is particularly bothered by it. She denies any UTI like symptoms.      She complains of constipation, but denies fecal or flatal incontinence.     She denies any vaginal complaints, no abnormal vaginal bleeding or discharge.      She has no other complaints.         From previous note  72-year-old with chronic urinary tract infections, urinary incontinence, neurogenic bladder with longstanding history of MS having undergone InterStim stage II on 10/19/2022.     The patient reports she is over all very satisfied with Interstim Stage II. She reports improvement in her bladder symptoms. She denies any urinary incontinence.     She complains of constipation, but denies fecal or flatal incontinence.     She denies any vaginal complaints, no abnormal vaginal bleeding or discharge.      She has no other complaints.         From previous note  71-year-old with chronic urinary tract infections, urinary incontinence, neurogenic bladder with longstanding history of MS.     The patient has increased her CIC use to roughly every 2-3 hours. She is emptying her bladder of between 5 and 8 ounces of urine. However even with this she continues to note urgency and urge related incontinence in between these catheterizations. She continues to note bothersome nighttime urgency and incontinence as well.     She  "denies any UTI-like symptoms today.     She has no other complaints.     From previous note  71-year-old presenting as a referral from Dr. Lopez with longstanding history of multiple sclerosis and neurogenic bladder with recurrent urinary tract infections.     The patient presents today with her . She utilizes CIC 3 times a day at 10:00, 4:00 and 10:00. Outside of these episodes she notes leaking. She does have near nightly enuresis as well. She has been treated on multiple occasions with 300 units Botox. Patient was most recently treated in May 2022. She stated that she had \"a few weeks of relief\" before her symptoms returned. She does believe that she has a urinary tract infection. . She could not provide a urine sample today. PVR is 84. She does not have any catheter supplies with her and would rather provide a sample tomorrow when she is visiting her primary care physician.     She otherwise denies any abnormal vaginal bleeding or discharge. She does not desire a pelvic exam today. She is not sexually active. She does have defecatory urgency.     She has no other complaints.   Review of Systems    Objective   Physical Exam  PHYSICAL EXAMINATION:  No LMP recorded.  Body mass index is 22.81 kg/m².  /59   Pulse 86   Wt 60.3 kg (132 lb 14.4 oz)   BMI 22.81 kg/m²   General Appearance: well appearing  Neuro: Alert and oriented   HEENT: mucous membranes moist, neck supple  Resp: No respiratory distress, normal work of breathing    After the patient was identified and consent was electronically signed, the patient was prepped with lidocaine and iodine.  A flexible cystoscope was then introduced into the bladder noting normal-appearing bladder mucosa and ureteral orifices in the normal orthotopic position.  Using an Olympus needle to a depth of 4 mm, 5 locations were injected across the posterior bladder wall using 2 cc aliquots of a mixture of 200 units Botox mixed into 10 cc of preservative-free " normal saline.  There was no bleeding noted.  The bladder was then completely drained.  The patient tolerated the procedure well.      Assessment/Plan       73-year-old with chronic urinary tract infections, urinary incontinence, neurogenic bladder with longstanding history of MS having undergone InterStim stage II 10/19/2022 at the [facility] and 200 units of Botox 06/26/2023, 11/10/2023 and today 03/18/2024.     #1 the patient has done well with her combined Botox and InterStim therapy in the past. The patient is overall very satisfied with her combined Botox and InterStim stage II device. She is presently on program 4 at 1.8 V. She has noted significant improvements in her urinary incontinence complaints. She has previously utilized 300 units Botox with Dr. Lopez with minimal long-term benefit. She continues to utilize her CIC roughly every 3 hours. She will continue her vaginal estrogen therapy. We discussed the importance of appropriate vaginal hygiene and not utilizing any soaps or irritants. She has had no bladder abnormalities noted on cystoscopic evaluation. We discussed the importance of timed voiding every 3-4 hours with her CIC.  She will  hold her Macrodantin at this time.  She will proceed with a 3-day course of Macrobid now.  She will restart her Macrodantin thereafter.     #2 we discussed her bowel related complaints. We discussed potential for MiraLAX therapy instead of her milk of magnesia. We discussed the importance of fiber therapy. We discussed titrating her fiber and laxative use to a soft bowel movement every day to 2 days.  She has noted some worsening loose stool.  We discussed decreasing her MiraLAX and continuing her fiber.     #3 the patient will follow-up for 200 units Botox as needed moving forward.  She will continue her daily Macrodantin.  She will follow-up in 2 to 3 weeks should she have no significant benefits in her lower urinary tract symptoms.    FEDERICO Hudson  MD Saúl     Scribe Attestation  By signing my name below, I, Linda Antonio Roche attest that this documentation has been prepared under the direction and in the presence of Goyo Hays MD. All medical record entries made by the Scribe were at my direction or personally dictated by me. I have reviewed the chart and agree that the record accurately reflects my personal performance of the history, physical exam, discussion and plan.

## 2024-04-12 DIAGNOSIS — H69.93 DYSFUNCTION OF BOTH EUSTACHIAN TUBES: ICD-10-CM

## 2024-04-14 RX ORDER — FLUTICASONE PROPIONATE 50 MCG
2 SPRAY, SUSPENSION (ML) NASAL DAILY
Qty: 16 ML | Refills: 3 | Status: SHIPPED | OUTPATIENT
Start: 2024-04-14 | End: 2024-04-15 | Stop reason: SDUPTHER

## 2024-04-15 ENCOUNTER — OFFICE VISIT (OUTPATIENT)
Dept: PRIMARY CARE | Facility: CLINIC | Age: 74
End: 2024-04-15
Payer: MEDICARE

## 2024-04-15 ENCOUNTER — TELEPHONE (OUTPATIENT)
Dept: HOME HEALTH SERVICES | Facility: HOME HEALTH | Age: 74
End: 2024-04-15

## 2024-04-15 VITALS
WEIGHT: 105 LBS | DIASTOLIC BLOOD PRESSURE: 68 MMHG | OXYGEN SATURATION: 96 % | BODY MASS INDEX: 18.02 KG/M2 | SYSTOLIC BLOOD PRESSURE: 102 MMHG | HEART RATE: 104 BPM

## 2024-04-15 DIAGNOSIS — M81.0 AGE-RELATED OSTEOPOROSIS WITHOUT CURRENT PATHOLOGICAL FRACTURE: ICD-10-CM

## 2024-04-15 DIAGNOSIS — L30.9 DERMATITIS: ICD-10-CM

## 2024-04-15 DIAGNOSIS — G35: ICD-10-CM

## 2024-04-15 DIAGNOSIS — G35 MULTIPLE SCLEROSIS (MULTI): ICD-10-CM

## 2024-04-15 DIAGNOSIS — Z00.00 ENCOUNTER FOR MEDICARE ANNUAL WELLNESS EXAM: Primary | ICD-10-CM

## 2024-04-15 DIAGNOSIS — Z00.00 HEALTHCARE MAINTENANCE: ICD-10-CM

## 2024-04-15 DIAGNOSIS — E46 PROTEIN-CALORIE MALNUTRITION, UNSPECIFIED SEVERITY (MULTI): ICD-10-CM

## 2024-04-15 DIAGNOSIS — R53.2: ICD-10-CM

## 2024-04-15 DIAGNOSIS — E55.9 VITAMIN D DEFICIENCY: ICD-10-CM

## 2024-04-15 DIAGNOSIS — E06.3 ACQUIRED AUTOIMMUNE HYPOTHYROIDISM: ICD-10-CM

## 2024-04-15 DIAGNOSIS — E78.5 HYPERLIPIDEMIA, UNSPECIFIED HYPERLIPIDEMIA TYPE: ICD-10-CM

## 2024-04-15 DIAGNOSIS — H69.93 DYSFUNCTION OF BOTH EUSTACHIAN TUBES: ICD-10-CM

## 2024-04-15 DIAGNOSIS — E53.8 B12 DEFICIENCY: ICD-10-CM

## 2024-04-15 PROCEDURE — G0439 PPPS, SUBSEQ VISIT: HCPCS | Performed by: STUDENT IN AN ORGANIZED HEALTH CARE EDUCATION/TRAINING PROGRAM

## 2024-04-15 PROCEDURE — 1036F TOBACCO NON-USER: CPT | Performed by: STUDENT IN AN ORGANIZED HEALTH CARE EDUCATION/TRAINING PROGRAM

## 2024-04-15 PROCEDURE — 1170F FXNL STATUS ASSESSED: CPT | Performed by: STUDENT IN AN ORGANIZED HEALTH CARE EDUCATION/TRAINING PROGRAM

## 2024-04-15 PROCEDURE — 1124F ACP DISCUSS-NO DSCNMKR DOCD: CPT | Performed by: STUDENT IN AN ORGANIZED HEALTH CARE EDUCATION/TRAINING PROGRAM

## 2024-04-15 PROCEDURE — G0446 INTENS BEHAVE THER CARDIO DX: HCPCS | Performed by: STUDENT IN AN ORGANIZED HEALTH CARE EDUCATION/TRAINING PROGRAM

## 2024-04-15 PROCEDURE — 1159F MED LIST DOCD IN RCRD: CPT | Performed by: STUDENT IN AN ORGANIZED HEALTH CARE EDUCATION/TRAINING PROGRAM

## 2024-04-15 PROCEDURE — 99215 OFFICE O/P EST HI 40 MIN: CPT | Performed by: STUDENT IN AN ORGANIZED HEALTH CARE EDUCATION/TRAINING PROGRAM

## 2024-04-15 PROCEDURE — 1160F RVW MEDS BY RX/DR IN RCRD: CPT | Performed by: STUDENT IN AN ORGANIZED HEALTH CARE EDUCATION/TRAINING PROGRAM

## 2024-04-15 RX ORDER — ROSUVASTATIN CALCIUM 10 MG/1
10 TABLET, COATED ORAL DAILY
Qty: 90 TABLET | Refills: 1 | Status: SHIPPED | OUTPATIENT
Start: 2024-04-15

## 2024-04-15 RX ORDER — AZELASTINE 1 MG/ML
1 SPRAY, METERED NASAL 2 TIMES DAILY
Qty: 30 ML | Refills: 2 | Status: SHIPPED | OUTPATIENT
Start: 2024-04-15

## 2024-04-15 RX ORDER — FLUTICASONE PROPIONATE 50 MCG
2 SPRAY, SUSPENSION (ML) NASAL DAILY
Qty: 16 ML | Refills: 3 | Status: SHIPPED | OUTPATIENT
Start: 2024-04-15

## 2024-04-15 RX ORDER — TRIAMCINOLONE ACETONIDE 1 MG/G
OINTMENT TOPICAL 2 TIMES DAILY
Qty: 80 G | Refills: 1 | Status: SHIPPED | OUTPATIENT
Start: 2024-04-15

## 2024-04-15 ASSESSMENT — PATIENT HEALTH QUESTIONNAIRE - PHQ9
2. FEELING DOWN, DEPRESSED OR HOPELESS: NOT AT ALL
2. FEELING DOWN, DEPRESSED OR HOPELESS: NOT AT ALL
SUM OF ALL RESPONSES TO PHQ9 QUESTIONS 1 AND 2: 0
SUM OF ALL RESPONSES TO PHQ9 QUESTIONS 1 AND 2: 0
1. LITTLE INTEREST OR PLEASURE IN DOING THINGS: NOT AT ALL
1. LITTLE INTEREST OR PLEASURE IN DOING THINGS: NOT AT ALL

## 2024-04-15 ASSESSMENT — ACTIVITIES OF DAILY LIVING (ADL)
BATHING: NEEDS ASSISTANCE
TAKING_MEDICATION: INDEPENDENT
DRESSING: NEEDS ASSISTANCE
MANAGING_FINANCES: NEEDS ASSISTANCE
GROCERY_SHOPPING: NEEDS ASSISTANCE
DOING_HOUSEWORK: NEEDS ASSISTANCE

## 2024-04-15 ASSESSMENT — ENCOUNTER SYMPTOMS
OCCASIONAL FEELINGS OF UNSTEADINESS: 1
DEPRESSION: 0
LOSS OF SENSATION IN FEET: 0

## 2024-04-15 NOTE — PROGRESS NOTES
Subjective   Reason for Visit: Coby Bonner is an 73 y.o. female here for a Medicare Wellness visit.          Reviewed all medications by prescribing practitioner or clinical pharmacist (such as prescriptions, OTCs, herbal therapies and supplements) and documented in the medical record.    HPI    Health Maintenance/MCR  Overall patient is doing well with some concerns.  Shingrix: UTD PCV13:12/2016 PPSV23 9/2020, Flu vaccine UTD, RSV UTD  COVID-19 vaccine 4 doses of pfizer, up-to-date with recent booster  Mammogram complete 1/2024, BI-RADS 2, yearly screenings recommended  Last colonoscopy ten years ago, Cologuard in 04/2023 was negative with 3 year-clearance,  Due 2026. denies family history of colon cancer.   Reports a healthy, balanced diet and limited activity due to MS.   Denies tobacco or etoh use.  DEXA scan performed and showed osteoporosis    2. Hyperlipidemia  Currently taking rosuvastatin 10 mg daily. Requesting refills.   Last lipid panel in Jan 2024 within normal limits.  CT Cardiac Calcium score completed December 2020 with score of 523.13.  Follows with Dr. Weller as indicated in the chart    3. Post nasal drip/Eustachian Tube Dysfunction   Uses fluticasone spray daily.   Exacerbation of symptoms with nasal dripping continuously in the back of her throat  Reports loss of appetite  as she feels nauseated  Reports weight loss of 5 pound in 5 days     4. Functional quadriplegia secondary to multiple sclerosis  Follows with neurology, Dr. Segovia.  Reports feeling stable currently.   Continues to take Baclofen, Nortriptyline, Primidone  Has an appointment later on this year  Unfortunately she has been noticing that she needs further care at home and this is even affecting her 's overall wellbeing  She and her  are both asking to discuss possible home health care to see if this would be a possible option    5. Recurrent Urinary Tract Infections/ Hyperactive neurogenic bladder  Follows with  urology for frequent UTIs and neurogenic bladder.  Self-caths 6 plus times daily.   Appears that she is now taking Macrobid on a scheduled basis  Feels well at this time    6. Dermatitis   Has a history of a dermatitis at her last appointment  At that time she was provided triamcinolone  Feels well without any issues/complaints  Previously saw derm for basal cell removal.     7. Insomnia  Complaining insomnia for months.   Denies difficulty falling asleep, but will awaken in the night with the urge to urinate and is unable to fall asleep afterwards.  Reports ~4 hours of sleep nightly.  Asking if medication could be even an option to help her sleep as this has becoming an issue as well    8. Acquired autoimmune hypothyroidism    Has a history of elevated TSH of 4.04 in October of 2020   Denies any signs/symptoms of hypothyroidism  Her last TSH was done earlier this year at 1.38    9. Vitamin B12 Deficiency  Last Vitamin B12 level was elevated 3315 in 03/2023  Was instructed to gradually lower the dose of B12 supplement  Currently taking B12 supplements daily.  Repeat B12 was well within normal limits at 366    10. Vitamin D Deficiency   Continues to take vitamin D supplementation    11.  Skin irritation  Patient noticed a change in color in the right lateral leg after spending sometime to a heating place . Denies any pain.  Wishes to discuss this further    No Known Allergies    Immunization History   Administered Date(s) Administered    Flu vaccine, quadrivalent, high-dose, preservative free, age 65y+ (FLUZONE) 12/12/2023    Influenza, High Dose Seasonal, Preservative Free 10/10/2016, 10/14/2017, 10/01/2018    Influenza, Seasonal, Quadrivalent, Adjuvanted 10/10/2021, 11/01/2022    Influenza, seasonal, injectable 11/01/2022    Pfizer COVID-19 vaccine, Fall 2023, 12 years and older, (30mcg/0.3mL) 11/27/2023    Pfizer COVID-19 vaccine, bivalent, age 12 years and older (30 mcg/0.3 mL) 11/01/2022    Pfizer Purple Cap  SARS-CoV-2 03/11/2021, 04/01/2021, 10/10/2021    Pneumococcal conjugate vaccine, 13-valent (PREVNAR 13) 12/01/2016    Pneumococcal polysaccharide vaccine, 23-valent, age 2 years and older (PNEUMOVAX 23) 09/01/2020, 09/17/2020    RESPIRATORY SYNCYTIAL VIRUS (RSV), ELIGIBLE PREGNANT PTS, 0.5 ML (ABRYSVO) 09/30/2023    Tdap vaccine, age 7 year and older (BOOSTRIX, ADACEL) 03/15/2022    Zoster vaccine, recombinant, adult (SHINGRIX) 01/01/2018, 08/08/2018, 10/30/2018       Patient Self Assessment of Health Status  Patient Self Assessment: Fair    Nutrition and Exercise  Current Diet: Well Balanced Diet  Adequate Fluid Intake: Yes  Caffeine: No  Exercise Frequency: No Exercise    Functional Ability/Level of Safety  Cognitive Impairment Observed: Cognitive impairment observed  Cognitive Impairment Reported: Cognitive impairment reported by patient or family    Home Safety Risk Factors: None    Patient Care Team:  Ante T Pletikosic, DO as PCP - General  Ante T Pletikosic, DO as PCP - [facility] Attributed Provider     Review of Systems  All pertinent positive symptoms are included in the history of present illness.    All other systems have been reviewed and are negative and noncontributory to this patient's current ailments.    Objective   Vitals:  /68 (BP Location: Left arm, Patient Position: Sitting, BP Cuff Size: Adult)   Pulse 104   Wt 47.6 kg (105 lb)   SpO2 96%   BMI 18.02 kg/m²     Lab on 01/15/2024   Component Date Value Ref Range Status    Thyroid Stimulating Hormone 01/15/2024 1.38  0.44 - 3.98 mIU/L Final    Cholesterol 01/15/2024 176  0 - 199 mg/dL Final          Age      Desirable   Borderline High   High     0-19 Y     0 - 169       170 - 199     >/= 200    20-24 Y     0 - 189       190 - 224     >/= 225         >24 Y     0 - 199       200 - 239     >/= 240   **All ranges are based on fasting samples. Specific   therapeutic targets will vary based on patient-specific   cardiac risk.    Pediatric  guidelines reference:Pediatrics 2011, 128(S5).Adult guidelines reference: NCEP ATPIII Guidelines,ANASTACIO 2001, 258:2486-09    Venipuncture immediately after or during the administration of Metamizole may lead to falsely low results. Testing should be performed immediately prior to Metamizole dosing.    HDL-Cholesterol 01/15/2024 90.7  mg/dL Final      Age       Very Low   Low     Normal    High    0-19 Y    < 35      < 40     40-45     ----  20-24 Y    ----     < 40      >45      ----        >24 Y      ----     < 40     40-60      >60      Cholesterol/HDL Ratio 01/15/2024 1.9   Final      Ref Values  Desirable  < 3.4  High Risk  > 5.0    LDL Calculated 01/15/2024 74  <=99 mg/dL Final                                Near   Borderline      AGE      Desirable  Optimal    High     High     Very High     0-19 Y     0 - 109     ---    110-129   >/= 130     ----    20-24 Y     0 - 119     ---    120-159   >/= 160     ----      >24 Y     0 -  99   100-129  130-159   160-189     >/=190      VLDL 01/15/2024 11  0 - 40 mg/dL Final    Triglycerides 01/15/2024 55  0 - 149 mg/dL Final       Age         Desirable   Borderline High   High     Very High   0 D-90 D    19 - 174         ----         ----        ----  91 D- 9 Y     0 -  74        75 -  99     >/= 100      ----    10-19 Y     0 -  89        90 - 129     >/= 130      ----    20-24 Y     0 - 114       115 - 149     >/= 150      ----         >24 Y     0 - 149       150 - 199    200- 499    >/= 500    Venipuncture immediately after or during the administration of Metamizole may lead to falsely low results. Testing should be performed immediately prior to Metamizole dosing.    Non HDL Cholesterol 01/15/2024 85  0 - 149 mg/dL Final          Age       Desirable   Borderline High   High     Very High     0-19 Y     0 - 119       120 - 144     >/= 145    >/= 160    20-24 Y     0 - 149       150 - 189     >/= 190      ----         >24 Y    30 mg/dL above LDL Cholesterol goal       Glucose 01/15/2024 83  74 - 99 mg/dL Final    Sodium 01/15/2024 139  136 - 145 mmol/L Final    Potassium 01/15/2024 4.2  3.5 - 5.3 mmol/L Final    Chloride 01/15/2024 104  98 - 107 mmol/L Final    Bicarbonate 01/15/2024 30  21 - 32 mmol/L Final    Anion Gap 01/15/2024 9 (L)  10 - 20 mmol/L Final    Urea Nitrogen 01/15/2024 13  6 - 23 mg/dL Final    Creatinine 01/15/2024 0.81  0.50 - 1.05 mg/dL Final    eGFR 01/15/2024 77  >60 mL/min/1.73m*2 Final    Calculations of estimated GFR are performed using the 2021 CKD-EPI Study Refit equation without the race variable for the IDMS-Traceable creatinine methods.  https://jasn.asnjournals.org/content/early/2021/09/22/ASN.7962875884    Calcium 01/15/2024 9.5  8.6 - 10.3 mg/dL Final    Albumin 01/15/2024 4.1  3.4 - 5.0 g/dL Final    Alkaline Phosphatase 01/15/2024 71  33 - 136 U/L Final    Total Protein 01/15/2024 6.7  6.4 - 8.2 g/dL Final    AST 01/15/2024 22  9 - 39 U/L Final    Bilirubin, Total 01/15/2024 0.5  0.0 - 1.2 mg/dL Final    ALT 01/15/2024 13  7 - 45 U/L Final    Patients treated with Sulfasalazine may generate falsely decreased results for ALT.    WBC 01/15/2024 3.0 (L)  4.4 - 11.3 x10*3/uL Final    nRBC 01/15/2024 0.0  0.0 - 0.0 /100 WBCs Final    RBC 01/15/2024 4.16  4.00 - 5.20 x10*6/uL Final    Hemoglobin 01/15/2024 13.1  12.0 - 16.0 g/dL Final    Hematocrit 01/15/2024 41.2  36.0 - 46.0 % Final    MCV 01/15/2024 99  80 - 100 fL Final    MCH 01/15/2024 31.5  26.0 - 34.0 pg Final    MCHC 01/15/2024 31.8 (L)  32.0 - 36.0 g/dL Final    RDW 01/15/2024 12.4  11.5 - 14.5 % Final    Platelets 01/15/2024 183  150 - 450 x10*3/uL Final    Neutrophils % 01/15/2024 49.5  40.0 - 80.0 % Final    Immature Granulocytes %, Automated 01/15/2024 0.0  0.0 - 0.9 % Final    Immature Granulocyte Count (IG) includes promyelocytes, myelocytes and metamyelocytes but does not include bands. Percent differential counts (%) should be interpreted in the context of the absolute cell counts  (cells/UL).    Lymphocytes % 01/15/2024 34.7  13.0 - 44.0 % Final    Monocytes % 01/15/2024 11.1  2.0 - 10.0 % Final    Eosinophils % 01/15/2024 3.0  0.0 - 6.0 % Final    Basophils % 01/15/2024 1.7  0.0 - 2.0 % Final    Neutrophils Absolute 01/15/2024 1.47 (L)  1.60 - 5.50 x10*3/uL Final    Percent differential counts (%) should be interpreted in the context of the absolute cell counts (cells/uL).    Immature Granulocytes Absolute, Au* 01/15/2024 0.00  0.00 - 0.50 x10*3/uL Final    Lymphocytes Absolute 01/15/2024 1.03  0.80 - 3.00 x10*3/uL Final    Monocytes Absolute 01/15/2024 0.33  0.05 - 0.80 x10*3/uL Final    Eosinophils Absolute 01/15/2024 0.09  0.00 - 0.40 x10*3/uL Final    Basophils Absolute 01/15/2024 0.05  0.00 - 0.10 x10*3/uL Final    Hemoglobin A1C 01/15/2024 5.4  see below % Final    Estimated Average Glucose 01/15/2024 108  Not Established mg/dL Final    Vitamin B12 01/15/2024 366  211 - 911 pg/mL Final    Vitamin D, 25-Hydroxy, Total 01/15/2024 59  30 - 100 ng/mL Final   Procedure Visit on 10/30/2023   Component Date Value Ref Range Status    Urine Culture 10/30/2023 >100,000 Escherichia coli (A)   Final       Physical Exam  CONSTITUTIONAL - well nourished, well developed, looks like stated age, in no acute distress, not ill-appearing, and not tired appearing  SKIN - dry skin noted  HEAD - no trauma, normocephalic  EYES - extraocular muscles are intact, and normal external exam  ENT -  uvula midline, normal tongue movement and throat normal, no exudate  NECK - supple without rigidity, no neck mass was observed, no thyromegaly or thyroid nodules  CHEST - clear to auscultation, no wheezing, no crackles and no rales, good effort  CARDIAC - regular rate and regular rhythm, no skipped beats, no murmur  ABDOMEN - no organomegaly, soft, nontender, nondistended, no guarding/rebound/rigidity,   EXTREMITIES - +1 bilateral pitting edema, no deformities, mild scoliosis present  NEUROLOGICAL - abnormal/slow gait,  essential tremor, irregular and abnormal balance, uses a walker   PSYCHIATRIC - alert, pleasant and cordial, age-appropriate  IMMUNOLOGIC - no cervical lymphadenopathy    Assessment/Plan     Health Maintenance/MCR  A complete history and physical were performed today.  We discussed all of your lab work in great detail that was done earlier this year  We will review test results once available and make recommendation accordingly  We did discuss osteoporosis and the findings on your DEXA scan   -Medication was recommended but deferred at this time, I recommend continuing vitamin D   and calcium  Mammogram up-to-date    Hyperlipidemia  Continue taking rosuvastatin 10 mg daily.  Refill have been sent to your pharmacy.  Stable without any changes  Continue to follow with Dr. Weller as needed.  The 10-year ASCVD risk score (Gale RUSHING, et al., 2019) is: 7.1%    Values used to calculate the score:      Age: 73 years      Sex: Female      Is Non- : No      Diabetic: No      Tobacco smoker: No      Systolic Blood Pressure: 102 mmHg      Is BP treated: No      HDL Cholesterol: 90.7 mg/dL      Total Cholesterol: 176 mg/dL  Cardiovascular risk discussed and, if needed, lifestyle modifications recommended, including nutritional choices, exercise, and elimination of habits contributing to risk.    We agreed on a plan to reduce the current cardiovascular risk    Nasal drip/Eustachian Tube Dysfunction  Use fluticasone spray daily alongside a nasal antihistamine, azelastine  I understand that the postnasal drip has been worsening and making your stomach upset  If this continues to be an issue within the next few weeks, please notify the office and I will direct you to an otolaryngologist    Functional quadriplegia secondary to multiple sclerosis  Continue to follow with neurology Dr. Segovia.   Continue to take Baclofen, Nortriptyline, Primidone as he prescribes.  I also placed a requisition for home health  care  They should be contacting you in the near future    Recurrent Urinary Tract Infections/ Hyperactive neurogenic bladder  Continue to follow with urology.  Stable, no changes in recommendations.    Dermatitis   Continue to follow with dermatology or our office as needed  No further recommendations at this time    Insomnia  I'm sorry to hear you are having difficulty with sleep.  Please follow up with your neurologist for treatment recommendations regarding this.  I also brought up the medication such as trazodone but before we initiate any of this medication he need to discuss this further with the neurologist as stated above    Acquired autoimmune hypothyroidism  Lab work has been stable  Will continue monitoring every year    Vitamin B12 Deficiency  Vitamin B12 within normal limits  Continue to take your Vitamin B12 supplement.    Vitamin D Deficiency   Vitamin D within normal limits  Continue to take your Vitamin D supplement.    11.  Skin irritation  I recommend you continue monitoring and if this worsens we will discuss a dermatology referral    Thank you for entrusting us with your healthcare needs.   Should you have any questions or concerns, please do not hesitate to contact our office.

## 2024-04-15 NOTE — TELEPHONE ENCOUNTER
Hello, [facility] received a referral for this patient, unfortunately we do not have an aide in this patient's area. Please let us know if we should move forward with the referral without the aide and we will review once the visit note from today is completed.     Thank you,   [facility] Intake

## 2024-04-16 ENCOUNTER — HOME HEALTH ADMISSION (OUTPATIENT)
Dept: HOME HEALTH SERVICES | Facility: HOME HEALTH | Age: 74
End: 2024-04-16
Payer: MEDICARE

## 2024-04-16 ENCOUNTER — DOCUMENTATION (OUTPATIENT)
Dept: HOME HEALTH SERVICES | Facility: HOME HEALTH | Age: 74
End: 2024-04-16
Payer: MEDICARE

## 2024-04-16 NOTE — HH CARE COORDINATION
Home Care received a Referral for Nursing, Physical Therapy, and Occupational Therapy. We have processed the referral for a Start of Care on 4.17.24 to 4.18.24.     If you have any questions or concerns, please feel free to contact us at [phone]. Follow the prompts, enter your five digit zip code, and you will be directed to your care team on E[facility] 3.

## 2024-04-17 ENCOUNTER — HOME CARE VISIT (OUTPATIENT)
Dept: HOME HEALTH SERVICES | Facility: HOME HEALTH | Age: 74
End: 2024-04-17
Payer: MEDICARE

## 2024-04-17 VITALS
DIASTOLIC BLOOD PRESSURE: 50 MMHG | OXYGEN SATURATION: 97 % | RESPIRATION RATE: 12 BRPM | HEART RATE: 84 BPM | SYSTOLIC BLOOD PRESSURE: 110 MMHG | TEMPERATURE: 98.6 F

## 2024-04-17 PROCEDURE — G0299 HHS/HOSPICE OF RN EA 15 MIN: HCPCS | Mod: HHH

## 2024-04-17 PROCEDURE — 169592 NO-PAY CLAIM PROCEDURE

## 2024-04-17 PROCEDURE — 1090000001 HH PPS REVENUE CREDIT

## 2024-04-17 PROCEDURE — 1090000002 HH PPS REVENUE DEBIT

## 2024-04-17 PROCEDURE — 0023 HH SOC

## 2024-04-17 ASSESSMENT — ENCOUNTER SYMPTOMS
PERSON REPORTING PAIN: PATIENT
TREMORS: 1
PAIN LOCATION: BACK
PAIN LOCATION - PAIN SEVERITY: 4/10
PAIN: 1
FATIGUE: 1
LAST BOWEL MOVEMENT: 66946
APPETITE LEVEL: FAIR
CHANGE IN APPETITE: VARYING
CONSTIPATION: 1
FATIGUES EASILY: 1
STOOL FREQUENCY: LESS THAN DAILY
DIARRHEA: 1
MUSCLE WEAKNESS: 1

## 2024-04-17 ASSESSMENT — ACTIVITIES OF DAILY LIVING (ADL): ENTERING_EXITING_HOME: ONE PERSON

## 2024-04-18 PROCEDURE — 1090000001 HH PPS REVENUE CREDIT

## 2024-04-18 PROCEDURE — 1090000002 HH PPS REVENUE DEBIT

## 2024-04-19 ENCOUNTER — HOME CARE VISIT (OUTPATIENT)
Dept: HOME HEALTH SERVICES | Facility: HOME HEALTH | Age: 74
End: 2024-04-19
Payer: MEDICARE

## 2024-04-19 ENCOUNTER — APPOINTMENT (OUTPATIENT)
Dept: HOME HEALTH SERVICES | Facility: HOME HEALTH | Age: 74
End: 2024-04-19
Payer: MEDICARE

## 2024-04-19 VITALS
OXYGEN SATURATION: 97 % | HEART RATE: 115 BPM | DIASTOLIC BLOOD PRESSURE: 75 MMHG | TEMPERATURE: 98.3 F | SYSTOLIC BLOOD PRESSURE: 105 MMHG

## 2024-04-19 VITALS — DIASTOLIC BLOOD PRESSURE: 75 MMHG | SYSTOLIC BLOOD PRESSURE: 105 MMHG | RESPIRATION RATE: 16 BRPM | TEMPERATURE: 98.3 F

## 2024-04-19 PROCEDURE — 1090000002 HH PPS REVENUE DEBIT

## 2024-04-19 PROCEDURE — G0151 HHCP-SERV OF PT,EA 15 MIN: HCPCS | Mod: HHH

## 2024-04-19 PROCEDURE — 1090000001 HH PPS REVENUE CREDIT

## 2024-04-19 PROCEDURE — G0152 HHCP-SERV OF OT,EA 15 MIN: HCPCS | Mod: HHH

## 2024-04-19 SDOH — HEALTH STABILITY: PHYSICAL HEALTH: EXERCISE COMMENTS: SUPINE KNEE TO CHEST, SLR  STANDING HEEL RAISES, MARCHING

## 2024-04-19 SDOH — HEALTH STABILITY: PHYSICAL HEALTH: EXERCISE TYPE: SUPINE, STANDING, SEE COMMENTS

## 2024-04-19 ASSESSMENT — ENCOUNTER SYMPTOMS
PAIN LOCATION - PAIN FREQUENCY: CONSTANT
PAIN: 1
PAIN SEVERITY GOAL: 0/10
PAIN LOCATION: BACK
MUSCLE WEAKNESS: 1
PAIN LOCATION - PAIN SEVERITY: 5/10
PAIN LOCATION - PAIN QUALITY: ACHING
SUBJECTIVE PAIN PROGRESSION: UNCHANGED
PAIN LOCATION: BACK
PAIN: 1
PERSON REPORTING PAIN: PATIENT
PAIN LOCATION - PAIN SEVERITY: 5/10
PAIN LOCATION - PAIN DURATION: VARIES
PERSON REPORTING PAIN: PATIENT
HIGHEST PAIN SEVERITY IN PAST 24 HOURS: 4/10
LOWEST PAIN SEVERITY IN PAST 24 HOURS: 0/10
PAIN LOCATION - PAIN FREQUENCY: INTERMITTENT

## 2024-04-19 ASSESSMENT — ACTIVITIES OF DAILY LIVING (ADL)
AMBULATION ASSISTANCE: 1
AMBULATION ASSISTANCE: STAND BY ASSIST
CURRENT_FUNCTION: STAND BY ASSIST
AMBULATION_DISTANCE/DURATION_TOLERATED: 50
AMBULATION ASSISTANCE ON FLAT SURFACES: 1

## 2024-04-19 NOTE — CASE COMMUNICATION
Pt, , OT and sister in apt. Pt declined further PT and will concentrate on OT. Does not want too many visitors each week.  PT can be re ordered if needed, OT will let PT know if needed again.  Primary Reason for Home Care:  MS, weakness, falls  Skilled Needs: Skilled PT for eval. Pt declined further PT. Pt doing HEP and using rollator. Indep with bed mobility and transfers. Pt agreed to OT since falls have been in the bathroom du ring self caths and ADLs. OT can also provide an UE ex program for UE and upper trunk.  Instruction provided: Fall prev and safety, LE ex program   Pt, CG  response to instruction: Agreeable  Pt CG instructed on plan of care and visit frequency. Pt, CG  in agreement with plan of care and visit frequency.  1W1  Discipline Communication: OT, RN, MD  Plan for next visit: NA, eval only

## 2024-04-19 NOTE — HOME HEALTH
OT Arline, 2w8: Patient presents s/p office visit. Precautions: MS, functional quadriplegia secondary to multiple sclerosis, catheter, HLD. Patient reports falling 2x in past 6 months. Patient previously independent for all ADLs/IADLs with 2ww, including Min A LBD, Mod A bathing, lift chair for STS. Current ADL status: Primary goal is to improve independence with bathing. Recommend: Toilet safety frame, transfer tub bench. Patient requires skilled OT services to address BUE strengthening, independence with ADLs/IADLs. Patient and caregiver agreeable to plan of care. Rehab potential appears good. All questions/concerns addressed. To be followed by MEME Cai/KATE.

## 2024-04-20 PROCEDURE — 1090000002 HH PPS REVENUE DEBIT

## 2024-04-20 PROCEDURE — 1090000001 HH PPS REVENUE CREDIT

## 2024-04-21 PROCEDURE — 1090000001 HH PPS REVENUE CREDIT

## 2024-04-21 PROCEDURE — 1090000002 HH PPS REVENUE DEBIT

## 2024-04-22 ENCOUNTER — HOME CARE VISIT (OUTPATIENT)
Dept: HOME HEALTH SERVICES | Facility: HOME HEALTH | Age: 74
End: 2024-04-22
Payer: MEDICARE

## 2024-04-22 VITALS
OXYGEN SATURATION: 95 % | DIASTOLIC BLOOD PRESSURE: 50 MMHG | HEART RATE: 78 BPM | SYSTOLIC BLOOD PRESSURE: 89 MMHG | TEMPERATURE: 98 F

## 2024-04-22 PROCEDURE — 1090000002 HH PPS REVENUE DEBIT

## 2024-04-22 PROCEDURE — 1090000001 HH PPS REVENUE CREDIT

## 2024-04-22 PROCEDURE — G0152 HHCP-SERV OF OT,EA 15 MIN: HCPCS | Mod: HHH

## 2024-04-23 ENCOUNTER — HOME CARE VISIT (OUTPATIENT)
Dept: HOME HEALTH SERVICES | Facility: HOME HEALTH | Age: 74
End: 2024-04-23
Payer: MEDICARE

## 2024-04-23 VITALS
OXYGEN SATURATION: 97 % | TEMPERATURE: 97.1 F | RESPIRATION RATE: 16 BRPM | DIASTOLIC BLOOD PRESSURE: 68 MMHG | HEART RATE: 99 BPM | SYSTOLIC BLOOD PRESSURE: 110 MMHG

## 2024-04-23 PROCEDURE — 1090000001 HH PPS REVENUE CREDIT

## 2024-04-23 PROCEDURE — 1090000002 HH PPS REVENUE DEBIT

## 2024-04-23 PROCEDURE — G0299 HHS/HOSPICE OF RN EA 15 MIN: HCPCS | Mod: HHH

## 2024-04-23 ASSESSMENT — ENCOUNTER SYMPTOMS
PAIN: 1
PERSON REPORTING PAIN: PATIENT
APPETITE LEVEL: GOOD
PAIN LOCATION - PAIN SEVERITY: 3/10
FATIGUES EASILY: 1
LIMITED RANGE OF MOTION: 1
CHANGE IN APPETITE: INCREASED
SUBJECTIVE PAIN PROGRESSION: WAXING AND WANING
PAIN LOCATION: LEFT SHOULDER
MUSCLE WEAKNESS: 1

## 2024-04-24 ENCOUNTER — HOME CARE VISIT (OUTPATIENT)
Dept: HOME HEALTH SERVICES | Facility: HOME HEALTH | Age: 74
End: 2024-04-24
Payer: MEDICARE

## 2024-04-24 VITALS — HEART RATE: 82 BPM | TEMPERATURE: 97.5 F | OXYGEN SATURATION: 97 %

## 2024-04-24 PROCEDURE — 1090000001 HH PPS REVENUE CREDIT

## 2024-04-24 PROCEDURE — 1090000002 HH PPS REVENUE DEBIT

## 2024-04-24 PROCEDURE — G0152 HHCP-SERV OF OT,EA 15 MIN: HCPCS | Mod: HHH

## 2024-04-24 ASSESSMENT — ENCOUNTER SYMPTOMS
PAIN LOCATION - PAIN DURATION: CHRONIC
PAIN LOCATION - RELIEVING FACTORS: REST
PAIN LOCATION: NECK
PERSON REPORTING PAIN: PATIENT
PAIN: 1
PAIN LOCATION - PAIN QUALITY: ACHING
PAIN LOCATION - PAIN FREQUENCY: INFREQUENT
PAIN LOCATION - EXACERBATING FACTORS: EXERCISE
PAIN LOCATION - PAIN SEVERITY: 3/10

## 2024-04-25 PROCEDURE — 1090000001 HH PPS REVENUE CREDIT

## 2024-04-25 PROCEDURE — 1090000002 HH PPS REVENUE DEBIT

## 2024-04-25 ASSESSMENT — ACTIVITIES OF DAILY LIVING (ADL): OASIS_M1830: 05

## 2024-04-26 ENCOUNTER — HOME CARE VISIT (OUTPATIENT)
Dept: HOME HEALTH SERVICES | Facility: HOME HEALTH | Age: 74
End: 2024-04-26
Payer: MEDICARE

## 2024-04-26 VITALS
RESPIRATION RATE: 12 BRPM | HEART RATE: 93 BPM | TEMPERATURE: 97.5 F | DIASTOLIC BLOOD PRESSURE: 75 MMHG | SYSTOLIC BLOOD PRESSURE: 120 MMHG | OXYGEN SATURATION: 98 %

## 2024-04-26 PROCEDURE — 1090000002 HH PPS REVENUE DEBIT

## 2024-04-26 PROCEDURE — 1090000001 HH PPS REVENUE CREDIT

## 2024-04-26 PROCEDURE — G0299 HHS/HOSPICE OF RN EA 15 MIN: HCPCS | Mod: HHH

## 2024-04-26 PROCEDURE — G0180 MD CERTIFICATION HHA PATIENT: HCPCS | Performed by: STUDENT IN AN ORGANIZED HEALTH CARE EDUCATION/TRAINING PROGRAM

## 2024-04-26 ASSESSMENT — ENCOUNTER SYMPTOMS
APPETITE LEVEL: FAIR
LIMITED RANGE OF MOTION: 1
CHANGE IN APPETITE: INCREASED
MUSCLE WEAKNESS: 1
DENIES PAIN: 1

## 2024-04-27 PROCEDURE — 1090000001 HH PPS REVENUE CREDIT

## 2024-04-27 PROCEDURE — 1090000002 HH PPS REVENUE DEBIT

## 2024-04-28 PROCEDURE — 1090000002 HH PPS REVENUE DEBIT

## 2024-04-28 PROCEDURE — 1090000001 HH PPS REVENUE CREDIT

## 2024-04-29 ENCOUNTER — HOME CARE VISIT (OUTPATIENT)
Dept: HOME HEALTH SERVICES | Facility: HOME HEALTH | Age: 74
End: 2024-04-29
Payer: MEDICARE

## 2024-04-29 VITALS
HEART RATE: 70 BPM | SYSTOLIC BLOOD PRESSURE: 105 MMHG | OXYGEN SATURATION: 95 % | DIASTOLIC BLOOD PRESSURE: 69 MMHG | TEMPERATURE: 99.3 F

## 2024-04-29 PROCEDURE — 1090000001 HH PPS REVENUE CREDIT

## 2024-04-29 PROCEDURE — G0152 HHCP-SERV OF OT,EA 15 MIN: HCPCS | Mod: HHH

## 2024-04-29 PROCEDURE — 1090000002 HH PPS REVENUE DEBIT

## 2024-04-29 ASSESSMENT — ACTIVITIES OF DAILY LIVING (ADL)
LAUNDRY ASSESSED: 1
LAUNDRY: INDEPENDENT

## 2024-04-30 ENCOUNTER — HOME CARE VISIT (OUTPATIENT)
Dept: HOME HEALTH SERVICES | Facility: HOME HEALTH | Age: 74
End: 2024-04-30
Payer: MEDICARE

## 2024-04-30 VITALS
RESPIRATION RATE: 16 BRPM | DIASTOLIC BLOOD PRESSURE: 77 MMHG | HEART RATE: 80 BPM | TEMPERATURE: 97.3 F | OXYGEN SATURATION: 98 % | SYSTOLIC BLOOD PRESSURE: 105 MMHG

## 2024-04-30 PROCEDURE — 1090000002 HH PPS REVENUE DEBIT

## 2024-04-30 PROCEDURE — 1090000001 HH PPS REVENUE CREDIT

## 2024-04-30 PROCEDURE — G0299 HHS/HOSPICE OF RN EA 15 MIN: HCPCS | Mod: HHH

## 2024-04-30 ASSESSMENT — ENCOUNTER SYMPTOMS
PERSON REPORTING PAIN: PATIENT
APPETITE LEVEL: FAIR
CONTUSION: 1
DENIES PAIN: 1
MUSCLE WEAKNESS: 1
LIMITED RANGE OF MOTION: 1
CHANGE IN APPETITE: INCREASED

## 2024-05-01 ENCOUNTER — HOME CARE VISIT (OUTPATIENT)
Dept: HOME HEALTH SERVICES | Facility: HOME HEALTH | Age: 74
End: 2024-05-01
Payer: MEDICARE

## 2024-05-01 VITALS
TEMPERATURE: 98.9 F | SYSTOLIC BLOOD PRESSURE: 100 MMHG | DIASTOLIC BLOOD PRESSURE: 56 MMHG | HEART RATE: 101 BPM | OXYGEN SATURATION: 98 %

## 2024-05-01 PROCEDURE — 1090000001 HH PPS REVENUE CREDIT

## 2024-05-01 PROCEDURE — 1090000002 HH PPS REVENUE DEBIT

## 2024-05-01 PROCEDURE — G0152 HHCP-SERV OF OT,EA 15 MIN: HCPCS | Mod: HHH

## 2024-05-01 ASSESSMENT — ENCOUNTER SYMPTOMS
PAIN LOCATION - RELIEVING FACTORS: TYLENOL
PAIN LOCATION - PAIN SEVERITY: 4/10
PAIN LOCATION - EXACERBATING FACTORS: EXERCISE
PAIN LOCATION - PAIN QUALITY: ACHING
PAIN: 1
PAIN LOCATION - PAIN DURATION: CHRONIC
PERSON REPORTING PAIN: PATIENT
PAIN LOCATION: LEFT SHOULDER
PAIN LOCATION - PAIN FREQUENCY: INFREQUENT

## 2024-05-02 PROCEDURE — 1090000002 HH PPS REVENUE DEBIT

## 2024-05-02 PROCEDURE — 1090000001 HH PPS REVENUE CREDIT

## 2024-05-03 PROCEDURE — 1090000002 HH PPS REVENUE DEBIT

## 2024-05-03 PROCEDURE — 1090000001 HH PPS REVENUE CREDIT

## 2024-05-04 PROCEDURE — 1090000001 HH PPS REVENUE CREDIT

## 2024-05-04 PROCEDURE — 1090000002 HH PPS REVENUE DEBIT

## 2024-05-05 PROCEDURE — 1090000002 HH PPS REVENUE DEBIT

## 2024-05-05 PROCEDURE — 1090000001 HH PPS REVENUE CREDIT

## 2024-05-06 ENCOUNTER — HOME CARE VISIT (OUTPATIENT)
Dept: HOME HEALTH SERVICES | Facility: HOME HEALTH | Age: 74
End: 2024-05-06
Payer: MEDICARE

## 2024-05-06 VITALS — OXYGEN SATURATION: 97 % | TEMPERATURE: 98.2 F | HEART RATE: 96 BPM

## 2024-05-06 PROCEDURE — 1090000001 HH PPS REVENUE CREDIT

## 2024-05-06 PROCEDURE — 1090000002 HH PPS REVENUE DEBIT

## 2024-05-06 PROCEDURE — G0152 HHCP-SERV OF OT,EA 15 MIN: HCPCS | Mod: HHH

## 2024-05-06 ASSESSMENT — ENCOUNTER SYMPTOMS
PERSON REPORTING PAIN: PATIENT
DENIES PAIN: 1

## 2024-05-07 ENCOUNTER — HOME CARE VISIT (OUTPATIENT)
Dept: HOME HEALTH SERVICES | Facility: HOME HEALTH | Age: 74
End: 2024-05-07
Payer: MEDICARE

## 2024-05-07 PROCEDURE — 1090000001 HH PPS REVENUE CREDIT

## 2024-05-07 PROCEDURE — 1090000002 HH PPS REVENUE DEBIT

## 2024-05-08 ENCOUNTER — HOME CARE VISIT (OUTPATIENT)
Dept: HOME HEALTH SERVICES | Facility: HOME HEALTH | Age: 74
End: 2024-05-08
Payer: MEDICARE

## 2024-05-08 VITALS
SYSTOLIC BLOOD PRESSURE: 100 MMHG | DIASTOLIC BLOOD PRESSURE: 55 MMHG | TEMPERATURE: 98.5 F | HEART RATE: 91 BPM | OXYGEN SATURATION: 97 %

## 2024-05-08 PROCEDURE — G0152 HHCP-SERV OF OT,EA 15 MIN: HCPCS | Mod: HHH

## 2024-05-08 PROCEDURE — 1090000002 HH PPS REVENUE DEBIT

## 2024-05-08 PROCEDURE — 1090000001 HH PPS REVENUE CREDIT

## 2024-05-09 ENCOUNTER — APPOINTMENT (OUTPATIENT)
Dept: NEUROLOGY | Facility: CLINIC | Age: 74
End: 2024-05-09
Payer: MEDICARE

## 2024-05-09 PROCEDURE — 1090000001 HH PPS REVENUE CREDIT

## 2024-05-09 PROCEDURE — 1090000002 HH PPS REVENUE DEBIT

## 2024-05-10 ENCOUNTER — HOME CARE VISIT (OUTPATIENT)
Dept: HOME HEALTH SERVICES | Facility: HOME HEALTH | Age: 74
End: 2024-05-10
Payer: MEDICARE

## 2024-05-10 VITALS
DIASTOLIC BLOOD PRESSURE: 75 MMHG | RESPIRATION RATE: 16 BRPM | SYSTOLIC BLOOD PRESSURE: 130 MMHG | OXYGEN SATURATION: 98 % | HEART RATE: 88 BPM | TEMPERATURE: 96.7 F

## 2024-05-10 PROCEDURE — 1090000002 HH PPS REVENUE DEBIT

## 2024-05-10 PROCEDURE — G0299 HHS/HOSPICE OF RN EA 15 MIN: HCPCS | Mod: HHH

## 2024-05-10 PROCEDURE — 1090000001 HH PPS REVENUE CREDIT

## 2024-05-10 ASSESSMENT — ENCOUNTER SYMPTOMS
APPETITE LEVEL: GOOD
PAIN LOCATION - PAIN SEVERITY: 3/10
LAST BOWEL MOVEMENT: 66970
LOWER EXTREMITY EDEMA: 1
CHANGE IN APPETITE: INCREASED
PERSON REPORTING PAIN: PATIENT
BOWEL PATTERN NORMAL: 1
TREMORS: 1
PAIN: 1
SUBJECTIVE PAIN PROGRESSION: UNCHANGED
PAIN LOCATION: BACK

## 2024-05-11 PROCEDURE — 1090000001 HH PPS REVENUE CREDIT

## 2024-05-11 PROCEDURE — 1090000002 HH PPS REVENUE DEBIT

## 2024-05-12 PROCEDURE — 1090000002 HH PPS REVENUE DEBIT

## 2024-05-12 PROCEDURE — 1090000001 HH PPS REVENUE CREDIT

## 2024-05-13 ENCOUNTER — HOME CARE VISIT (OUTPATIENT)
Dept: HOME HEALTH SERVICES | Facility: HOME HEALTH | Age: 74
End: 2024-05-13
Payer: MEDICARE

## 2024-05-13 VITALS
OXYGEN SATURATION: 99 % | HEART RATE: 79 BPM | SYSTOLIC BLOOD PRESSURE: 124 MMHG | TEMPERATURE: 97.4 F | DIASTOLIC BLOOD PRESSURE: 70 MMHG

## 2024-05-13 PROCEDURE — G0152 HHCP-SERV OF OT,EA 15 MIN: HCPCS | Mod: HHH

## 2024-05-13 PROCEDURE — 1090000001 HH PPS REVENUE CREDIT

## 2024-05-13 PROCEDURE — 1090000002 HH PPS REVENUE DEBIT

## 2024-05-13 ASSESSMENT — ENCOUNTER SYMPTOMS
PAIN LOCATION - PAIN SEVERITY: 5/10
PAIN: 1
PAIN LOCATION - RELIEVING FACTORS: REST
PAIN LOCATION - PAIN QUALITY: ACHING
PAIN LOCATION - PAIN FREQUENCY: FREQUENT
PAIN LOCATION: LEFT SHOULDER
PERSON REPORTING PAIN: PATIENT
PAIN LOCATION - PAIN DURATION: ACUTE
PAIN LOCATION - EXACERBATING FACTORS: EXERCISE

## 2024-05-14 PROCEDURE — 1090000001 HH PPS REVENUE CREDIT

## 2024-05-14 PROCEDURE — 1090000002 HH PPS REVENUE DEBIT

## 2024-05-15 ENCOUNTER — HOME CARE VISIT (OUTPATIENT)
Dept: HOME HEALTH SERVICES | Facility: HOME HEALTH | Age: 74
End: 2024-05-15
Payer: MEDICARE

## 2024-05-15 VITALS
OXYGEN SATURATION: 97 % | SYSTOLIC BLOOD PRESSURE: 115 MMHG | TEMPERATURE: 98.1 F | HEART RATE: 82 BPM | DIASTOLIC BLOOD PRESSURE: 70 MMHG

## 2024-05-15 PROCEDURE — 1090000002 HH PPS REVENUE DEBIT

## 2024-05-15 PROCEDURE — 1090000001 HH PPS REVENUE CREDIT

## 2024-05-15 PROCEDURE — G0152 HHCP-SERV OF OT,EA 15 MIN: HCPCS | Mod: HHH

## 2024-05-15 ASSESSMENT — ACTIVITIES OF DAILY LIVING (ADL)
ADLS_COMMENTS: TINENCE AIDS SUCH AS PAD, ETC. THE PATIENT MAY REQUIRE SUPERVISION WITH THE USE OF SUPPOSITORY OR ENEMA AND HAS OCCASIONAL ACCIDENTS.   10   THE PATIENT CAN CONTROL BOWELS AND HAS NO ACCIDENTS, CAN USE SUPPOSITORY, OR TAKE AN ENEMA WHEN NECESSARY.
ADLS_COMMENTS: ET PAPER WITHOUT HELP. IF NECESSARY, THE PATIENT MAY USE A BED PAN OR COMMODE OR URINAL AT NIGHT, BUT MUST BE ABLE TO EMPTY IT AND CLEAN IT.     BOWEL CONTROL   0   THE PATIENT IS BOWEL INCONTINENT.   2   THE PATIENT NEEDS HELP TO ASSUME APPROPRIATE
ADLS_COMMENTS: LE TO CONDUCT OWN PERSONAL HYGIENE BUT REQUIRES MIN ASSIST BEFORE AND/OR AFTER THE OPERATION.   5    THE PATIENT CAN WASH HIS/HER HANDS AND FACE, COMB HAIR, CLEAN TEETH AND SHAVE. A MALE PATIENT MAY USE ANY KIND OF RAZOR BUT MUST INSERT THE BLADE, OR
ADLS_COMMENTS: WHERE PATIENTS MOVE FROM DEPENDENCY TO ASSISTED INDEPENDENCE.   60 - 80    IF LIVING ALONE WILL PROBABLY NEED A NUMBER OF COMMUNITY SERVICES TO COPE.   **MORE THAN 85     LIKELY TO BE DISCHARGED TO COMMUNITY LIVING - INDEPENDENT IN TRANSFERS AND ABL
ADLS_COMMENTS: UIRED WITH EITHER TRANSFER TO SHOWER/BATH OR WITH WASHING OR DRYING; INCLUDING INABILITY TO COMPLETE A TASK BECAUSE OF CONDITION OR DISEASE, ETC.   4    SUPERVISION IS REQUIRED FOR SAFETY IN ADJUSTING THE WATER TEMPERATURE, OR IN THE TRANSFER.   5
ADLS_COMMENTS: EVERE DEPENDENCE     21 - 60  MODERATE DEPENDENCE     61 - 90  **SLIGHT DEPENDENCE      91 - 99  INDEPENDENCE        100    SCORE PREDICTION    LESS THAN 40    UNLIKELY TO GO HOME - DEPENDENT IN MOBILITY - DEPENDENT IN SELF CARE   60    PIVOTAL SCORE
ADLS_COMMENTS: POSITION, AND WITH BOWEL MOVEMENT FACILITATORY TECHNIQUES.   5   THE PATIENT CAN ASSUME APPROPRIATE POSITION, BUT CANNOT USE FACILITATORY TECHNIQUES OR CLEAN SELF WITHOUT ASSISTANCE AND HAS FREQUENT ACCIDENTS.   8    ASSISTANCE IS REQUIRED WITH INCON
ADLS_COMMENTS: AND NIGHT, AND/OR IS INDEPENDENT WITH INTERNAL OR EXTERNAL DEVICES.     BATHING   0    TOTAL DEPENDENCE IN BATHING SELF.   1    ASSISTANCE IS REQUIRED IN ALL ASPECTS OF BATHING, BUT PATIENT IS ABLE TO MAKE SOME CONTRIBUTION.   3    ASSISTANCE IS REQ
ADLS_COMMENTS: BLADDER CONTROL   0    THE PATIENT IS DEPENDENT IN BLADDER MANAGEMENT, IS INCONTINENT, OR HAS INDWELLING CATHETER.   2    THE PATIENT IS INCONTINENT BUT IS ABLE TO ASSIST WITH THE APPLICATION OF AN INTERNAL OR EXTERNAL DEVICE.   5    THE PATIENT IS
ADLS_COMMENTS: ANCE IS REQUIRED TO MANIPULATE CHAIR TO TABLE, BED, ETC.   4    THE PATIENT CAN PROPEL SELF FOR A REASONABLE DURATION OVER REGULARLY ENCOUNTERED TERRAIN. MINIMAL ASSISTANCE MAY STILL BE REQUIRED IN “TIGHT CORNERS” OR TO NEGOTIATE A KERB 100MM HIGH.
ADLS_COMMENTS: 5    TO PROPEL WHEELCHAIR INDEPENDENTLY, THE PATIENT MUST BE ABLE TO GO AROUND CORNERS, TURN AROUND, MANOEUVRE THE CHAIR TO A TABLE, BED, TOILET, ETC. THE PATIENT MUST BE ABLE TO PUSH A CHAIR AT LEAST 50 METRES AND NEGOTIATE KERB.       STAIR CLIMBI
ADLS_COMMENTS: PLUG IN THE RAZOR WITHOUT HELP, AS WELL AS RETRIEVE IT FROM THE DRAWER OR CABINET. A FEMALE PATIENT MUST APPLY HER OWN MAKE-UP, IF USED, BUT NEED NOT BRAID OR STYLE HER HAIR.     FEEDING    0    DEPENDENT IN ALL ASPECTS AND NEEDS TO BE FED, NASOGAST
ADLS_COMMENTS: GENERALLY DRY BY DAY, BUT NOT AT NIGHT AND NEEDS SOME ASSISTANCE WITH THE DEVICES.   8    GENERALLY DRY BY DAY AND NIGHT, MAY HAVE OCCAS ACCIDENT OR NEED MIN ASSIST WITH INTERNAL OR EXTERNAL DEVICES.   10   THE PATIENT IS ABLE TO CONTROL BLADDER DAY
ADLS_COMMENTS: NAL HYGIENE AND IS DEPENDENT IN ALL ASPECTS.   1    ASSISTANCE IS REQUIRED IN ALL STEPS OF PERSONAL HYGIENE, BUT PATIENT ABLE TO MAKE SOME CONTRIBUTION.   3    SOME ASSISTANCE IS REQUIRED IN ONE OR MORE STEPS OF PERSONAL HYGIENE.   4    PATIENT IS AB
ADLS_COMMENTS: OF DRESSING AND IS UNABLE TO PARTICIPATE IN THE ACTIVITY.   2     THE PATIENT IS ABLE TO PARTICIPATE TO SOME DEGREE, BUT IS DEPENDENT IN ALL ASPECTS OF DRESSING.   5     ASSISTANCE IS NEEDED IN PUTTING ON, AND/OR REMOVING ANY CLOTHING.   8     ONLY M
ADLS_COMMENTS: ES, CANES, OR A WALKARETTE, AND WALK 50 METRES WITHOUT HELP OR SUPERVISION.     AMBULATION/WHEELCHAIR * (IF UNABLE TO WALK) ONLY USE THIS ITEM IF THE PATIENT IS RATED “0” FOR AMBULATION, AND THEN ONLY IF THE PATIENT HAS BEEN TRAINED IN WHEELCHAIR MAN
ADLS_COMMENTS: RIC NEEDS TO BE ADMINISTERED.   2    CAN MANIPULATE AN EATING DEVICE, USUALLY A SPOON, BUT SOMEONE MUST PROVIDE ACTIVE ASSISTANCE DURING THE MEAL.   5    ABLE TO FEED SELF WITH SUPERVISION. ASSISTANCE IS REQUIRED WITH ASSOCIATED TASKS SUCH AS PUTTING
ADLS_COMMENTS: THE TRANSFER.   8    THE TRANSFER REQUIRES THE ASSISTANCE OF ONE OTHER PERSON. ASSISTANCE MAY BE REQUIRED IN ANY ASPECT OF THE TRANSFER.   12  THE PRESENCE OF ANOTHER PERSON IS REQUIRED EITHER AS A CONFIDENCE MEASURE, OR TO PROVIDE SUPERVISION FOR S
ADLS_COMMENTS: AIR, TRANSFER BACK INTO IT SAFELY AND/OR GRASP AID AND STAND. THE PATIENT MUST BE INDEPENDENT IN ALL PHASES OF THIS ACTIVITY.     AMBULATION    0    DEPENDENT IN AMBULATION  3    CONSTANT PRESENCE OF ONE OR MORE ASSISTANT IS REQUIRED DURING AMBULATIO
ADLS_COMMENTS: NCE OR SAFETY IN HAZARDOUS SITUATIONS.   15   THE PATIENT MUST BE ABLE TO WEAR BRACES IF REQUIRED, LOCK AND UNLOCK THESE BRACES ASSUME STANDING POSITION, SIT DOWN, AND PLACE THE NECESSARY AIDS INTO POSITION FOR USE. THE PATIENT MUST BE ABLE TO CRUTCH
ADLS_COMMENTS: EMENT OF CLOTHING, TRANSFERRING, OR WASHING HANDS.   8    SUP MAY BE REQUIRED FOR SAFETY WITH NORMAL TOILET. BSC MAY BE USED AT NIGHT, ASSIST FOR EMPTYING AND CLEANING.   10   THE PATIENT IS ABLE TO GET ON/OFF THE TOILET, FASTEN CLOTHING AND USE TOIL
ADLS_COMMENTS: TRAY OR TABLE WHEN SOMEONE PUTS THE FOOD WITHIN REACH. THE PATIENT MUST PUT ON AN ASSISTIVE DEVICE IF NEEDED, CUT FOOD, AND IF DESIRED USE SALT AND PEPPER, SPREAD BUTTER, ETC.    SCORE  95/100    SCORE INTERPRETATION   TOTAL DEPENDENCE     00 - 20  S
ADLS_COMMENTS: MILK/SUGAR INTO TEA, SALT, PEPPER, SPREADING BUTTER, TURNING A PLATE OR OTHER “SET UP” ACTIVITIES.   8    INDEP WITH PREPARED TRAY, MAY NEED MEAT CUT, MILK CARTON/JAR LID OPENED. ANOTHER PERSON IS NOT REQUIRED  10   THE PATIENT CAN FEED SELF FROM A
ADLS_COMMENTS: AFETY.   15  THE PATIENT CAN SAFELY APPROACH THE BED WALKING OR IN A WHEELCHAIR, LOCK BRAKES, LIFT FOOTRESTS, OR POSITION WALKING AID, MOVE SAFELY TO BED, LIE DOWN, COME TO A SITTING POSITION ON THE SIDE OF THE BED, CHANGE THE POSITION OF THE WHEELCH
ADLS_COMMENTS: N.   8    ASSISTANCE IS REQUIRED WITH REACHING AIDS AND/OR THEIR MANIPULATION. ONE PERSON IS REQUIRED TO OFFER ASSISTANCE.   12   THE PATIENT IS INDEPENDENT IN AMBULATION BUT UNABLE TO WALK 50 METRES WITHOUT HELP, OR SUPERVISION IS NEEDED FOR CONFIDE
ADLS_COMMENTS: ISION AND ASSISTANCE.   8    GENERALLY NO ASSISTANCE IS REQUIRED. AT TIMES SUP IS REQUIRED FOR SAFETY DUE TO MORNING STIFFNESS, SOB, ETC  10   THE PATIENT IS ABLE TO GO UP/DOWN A FLIGHT OF STAIRS SAFELY WITHOUT HELP OR SUPERVISION,USE HAND RAILS, CAN
ADLS_COMMENTS: THE PATIENT MAY USE A BATHTUB, A SHOWER, OR TAKE A COMPLETE SPONGE BATH. THE PATIENT MUST BE ABLE TO DO ALL THE STEPS OF WHICHEVER METHOD IS EMPLOYED WITHOUT ANOTHER PERSON BEING PRESENT.     DRESSING   0     THE PATIENT IS DEPENDENT IN ALL ASPECTS
ADLS_COMMENTS: NG   0    THE PATIENT IS UNABLE TO CLIMB STAIRS.   2    ASSISTANCE IS REQUIRED IN ALL ASPECTS OF CHAIR CLIMBING, INCLUDING ASSISTANCE WITH WALKING AIDS.   5    THE PATIENT IS ABLE TO ASCEND/DESCEND BUT IS UNABLE TO CARRY WALKING AIDS AND NEEDS SUPERV
ADLS_COMMENTS: INIMAL ASSISTANCE IS REQUIRED WITH FASTENING CLOTHING SUCH AS BUTTONS, ZIPS, BRA, SHOES, ETC.   10   THE PATIENT IS ABLE TO PUT ON, REMOVE, CORSET, BRACES, AS PRESCRIBED.     PERSONAL HYGIENE (GROOMING)   0    THE PATIENT IS UNABLE TO ATTEND TO PERSO
ADLS_COMMENTS: CHAIR/BED TRANSFERS  0    UNABLE TO PARTICIPATE IN A TRANSFER. TWO ATTENDANTS ARE REQUIRED TO TRANSFER THE PATIENT WITH OR WITHOUT A MECHANICAL DEVICE.   3    ABLE TO PARTICIPATE BUT MAXIMUM ASSISTANCE OF ONE OTHER PERSON IS REQUIRE IN ALL ASPECTS OF
ADLS_COMMENTS: AGEMENT.   0    DEPENDENT IN WHEELCHAIR AMBULATION.   1    PATIENT CAN PROPEL SELF SHORT DISTANCES ON FLAT SURFACE, BUT ASSISTANCE IS REQUIRED FOR ALL OTHER STEPS OF WHEELCHAIR MANAGEMENT.  3    PRESENCE OF ONE PERSON IS NECESSARY AND CONSTANT ASSIST
ADLS_COMMENTS: E TO WALK OR USE WHEELCHAIR INDEPENDENTLY.

## 2024-05-15 ASSESSMENT — ENCOUNTER SYMPTOMS
PAIN: 1
PERSON REPORTING PAIN: PATIENT
PAIN LOCATION: BACK
PAIN LOCATION - RELIEVING FACTORS: STRETCHING
PAIN LOCATION - PAIN SEVERITY: 5/10
PAIN LOCATION - PAIN DURATION: CHRONIC
PAIN LOCATION - PAIN QUALITY: ACHING
PAIN LOCATION - EXACERBATING FACTORS: STANDING
PAIN LOCATION - PAIN FREQUENCY: FREQUENT

## 2024-05-15 NOTE — HOME HEALTH
"OT Reassessment: Patient demonstrates improved ADL status (Mod I self-cath, refused recommendation of tub rail), therapeutic exercise, and functional mobility with skilled OT services. Home modification planned for installing walk-in shower. Patient has had 0 falls since SOC (improved from 2x/6 months). Barthel Index ([place] with ADLs): improved to 95%. Five Times Sit-to-Stand Assessment (with UE support): Improved from 11.48 seconds. Vitals (post): 02 sat 97% on room air, HR 82 bpm. Patient would benefit from continued home OT services to address BUE Strengthening, FMC/ strengthening, dynamic standing balance training, and independence with IADLs. All questions/concerns addressed. Continue POC.    INDICATE EFFECTIVENESS OF PLAN    Regarding progress, patient states, \"My upper body strength has really improved. I feel that my balance too has been effected.\"    VARIABLE FACTORS EFFECTING RESPONSE TO TX:    office visit. Precautions: MS, functional quadriplegia secondary to multiple sclerosis, catheter, HLD.     IMPACT OF COMORBIDITIES ON PROGRESS:    impaired ADLs, impaired activity tolerance, low vision, [place], impaired sequencing, generalized weakness, pain, decreased balance, executive dysfunction, impaired FMC/GMC, decreased processing speed, need for v.c.'s for safety awareness.     EXPECTED PROGRESS TOWARDS GOALS:    Patient demonstrates good rehab potential as demonstrated by supportive caregiver (spouse), excellent motivation, good social support, all necessary AE/DME available, success with current plan of care.    MEASUREMENT TOOLS    Barthel Index, MMT, ROM Screener, Vitals, Five Times STS, Activity Analysis during ADLs, 9-Hole Peg FMC Assessment, Dynonometer.    JUSTIFICATION OF CONTINUED SERVICES    Patient is at significant risk for falls and increased generalized weakness resulting in increased need for assist from caregiver without continued OT services.    PLAN FOR CONTINUED SERVICES:     BUE " Strengthening, FMC/ strengthening, balance training, and independence with IADLs.

## 2024-05-16 ENCOUNTER — OFFICE VISIT (OUTPATIENT)
Dept: NEUROLOGY | Facility: CLINIC | Age: 74
End: 2024-05-16
Payer: MEDICARE

## 2024-05-16 VITALS
SYSTOLIC BLOOD PRESSURE: 105 MMHG | HEART RATE: 76 BPM | RESPIRATION RATE: 18 BRPM | BODY MASS INDEX: 21.8 KG/M2 | DIASTOLIC BLOOD PRESSURE: 58 MMHG | WEIGHT: 127 LBS

## 2024-05-16 DIAGNOSIS — G35 MULTIPLE SCLEROSIS (MULTI): Primary | ICD-10-CM

## 2024-05-16 PROCEDURE — 1126F AMNT PAIN NOTED NONE PRSNT: CPT | Performed by: PSYCHIATRY & NEUROLOGY

## 2024-05-16 PROCEDURE — 1159F MED LIST DOCD IN RCRD: CPT | Performed by: PSYCHIATRY & NEUROLOGY

## 2024-05-16 PROCEDURE — 99214 OFFICE O/P EST MOD 30 MIN: CPT | Mod: GC | Performed by: PSYCHIATRY & NEUROLOGY

## 2024-05-16 PROCEDURE — 99214 OFFICE O/P EST MOD 30 MIN: CPT | Performed by: PSYCHIATRY & NEUROLOGY

## 2024-05-16 PROCEDURE — 1160F RVW MEDS BY RX/DR IN RCRD: CPT | Performed by: PSYCHIATRY & NEUROLOGY

## 2024-05-16 PROCEDURE — 1090000001 HH PPS REVENUE CREDIT

## 2024-05-16 PROCEDURE — 1090000002 HH PPS REVENUE DEBIT

## 2024-05-16 RX ORDER — GABAPENTIN 800 MG/1
800 TABLET ORAL 3 TIMES DAILY
Qty: 90 TABLET | Refills: 5 | Status: SHIPPED | OUTPATIENT
Start: 2024-05-16 | End: 2024-11-12

## 2024-05-16 RX ORDER — BACLOFEN 10 MG/1
10 TABLET ORAL 4 TIMES DAILY
Qty: 120 TABLET | Refills: 5 | Status: SHIPPED | OUTPATIENT
Start: 2024-05-16 | End: 2024-11-12

## 2024-05-16 ASSESSMENT — PAIN SCALES - GENERAL: PAINLEVEL: 0-NO PAIN

## 2024-05-16 NOTE — PATIENT INSTRUCTIONS
Ms Jamesonbernardino,     You were seen in clinic today by Dr Segovia to monitor your MS.     Continue your baclofen, primidone, citalopram, and nortriptyline, and gabapentin for symptomatic management. We refilled your baclofen and gabapentin today.    Continue home OT and home exercises.    Follow up in 6 months.

## 2024-05-16 NOTE — PROGRESS NOTES
Subjective     Coby Bonner is a 73 y.o. RH female presents to clinic for continued management of MS. Patient is a prior patient of Dr. Harding, had been following with him since at least 2008.      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  2010 - neuropathic leg pain on gabapentin 800TID, and baclofen 20 QID for spasticity. Primidione at bedtime. Continued L hemiplegia and spastic paraparesis. Titubations of head.  2014 - more speech difficulty.   History of vision loss, painful vision, double vision, sudden onset numbness or weakness, hot showers/hot weather causing symptoms, balance issues  2016 - episode of lymphopenia, had 2 month drug holiday. Urinary retention, adjusting oxybutynin dose.   2019 - recurrent UTIs. Seeing urology and ID. Stopped Tecfidera due to persistent leukopenia. Underwent botox for neurogenic baldder.   2020- off DMT for most of the year, no new reported symptoms or disability.   2022- remained off DMT. More falls, legs weaker. Neurogenic bladder (Interstim stage II device placed), self-caths   2023 - Progression in gait instability.      Interval Hx:  Reports she is doing okay, as good as it can be.   Due to limitation in mobility and decreased ability of her  to care for her, she was started with home health care by her PCP, but this is finished (patient reports she was too good  for them to keep coming). Still has home occupational therapy 1-2 times per week. Finished PT which helped her some.  No trouble swallowing.  She does have insomnia intermittently, waking up too early. Goes to sleep without issues, but just wakes up early. Sleeping 4-5hrs at night, naps 2-3hrs each day.   Taking nortriptyline 20mg at bedtime, citalopram 20mg daily, primidone 50mg BID, gabapentin 800mg TID, baclofen 10mg QID. Happy with her symptomatic medications.   Current concerns are spasticity and fatigue, but she states the baclofen helps a lot with her spasticity.  She states she has learned to cope with her symptoms.     Past Surgical History:   Procedure Laterality Date    CATARACT EXTRACTION  08/25/2014    Cataract Surgery    COLONOSCOPY  06/29/2015    Complete Colonoscopy    GALLBLADDER SURGERY  09/01/2016    Gallbladder Surgery    OTHER SURGICAL HISTORY  11/21/2022    Percutaneous insertion of neurostimulator electrode into sacral nerve     Social History     Tobacco Use    Smoking status: Never    Smokeless tobacco: Never   Substance Use Topics    Alcohol use: Not on file     No Known Allergies  Visit Vitals  Smoking Status Never       Objective   Neurological Exam  Physical Exam    GENERAL: No distress, alert, interactive and cooperative.   Spastic dysarthria present.     NEURO EXAM:  MENTAL STATE:   Alert and oriented to self, date, place. Recent and remote memory was intact. Attention span and concentration were normal. Language testing was normal for comprehension, repetition, expression, and naming.      CRANIAL NERVES:   CN 2    Visual fields full to confrontation.   CN 3, 4, 6   R pupil is 4mm, L pupil 3mm. Both pupils reactive to light, no RAPD. Lids symmetric; no ptosis. EOMs normal alignment, full range with normal saccades, pursuit and convergence. No nystagmus.   CN 5   Facial sensation intact bilaterally.   CN 7   Normal and symmetric facial strength. Nasolabial folds symmetric.   CN 8  "  Hearing intact to finger rub   CN 9   Palate elevates symmetrically.   CN 11   Normal strength of shoulder shrug and neck turning.   CN 12   Tongue midline, with normal bulk and strength; no fasciculations. Dysarthria with spastic speech.     MOTOR:   Muscle bulk: thin. Bilateral LE with 2+ pitting edema  Muscle tone: mild-moderately increased tone in BLE  Movements: \"no-no\" head tremor present     Deltoid: R5-L5-  Biceps: R5-L5-   Triceps: R4L4  Wrist Flex: R4L4  Wrist Ext: R4L4  Finger Abd: R5L5     Hip Flex: R4-L3-  Knee Flex: R4+L4+  Knee Ext: R4L3+  DorsiFlex: R4L4  PlantarFlex: R4L4     SENSORY:  Intact to light touch in all extremities   Reduced pinprick AND vibration sense in all 4 E (L>R)     REFLEXES:   Biceps: R2L2  Triceps: R2L2  Brachioradialis: R2L2  Patellar: R2L2  Achilles: R2L2  Plantar Response: Downgoing bilaterally      COORDINATION:   Finger-Nose-Finger: intact b/l slow but accurate   Heel to Shin: unable to assess due to hip flexor weakness     GAIT:  slow but regular leann, ambulates with walker, fairly symmetrical stride length, but decreased.     PEG test   9/2023: R 37.48 L 30.65  5/2024: R 33.01 L 28.72    25ft walk:   9/2023: 35.72 secs (with walker)  5/2024: 21.67 secs (with walker)    Assessment/Plan   Ms Bonner, 73 RH F, presents to clinic for continued management of MS. Patient is a prior patient of Dr. Harding, had been following with him since at least 2008. She was diagnosed with RRMS in 1998 and tried several medications (copaxone, avonex), most recently tecfidera, but stopped in 2019 due leukopenia. Repeat MRI in 3/2023 has demonstrated increasing disease burden with several new demyelinating lesions (none active), however compared with a 2004 MRI. Patients clinical hx and imaging consistent with SPMS. Current symptoms include gait instability and lower extremity weakness affecting mobility. At this time, no approved therapies for this stage of disease. Symptomatic management. "      Plan:  - continue home OT  - continue baclofen 10mg QID for spasticity (refilled)  - continue primidone 50mg QID for head tremor   - continue gabapentin 800TID for neuropathic leg pain (refilled)  - continue citalopram 20mg daily and nortriptyline 20mg daily      Return to clinic for follow up with Dr. Segovia in 6 months.     Iris Childress MD  Neurology PGY2    I have seen and evaluated the patient with Dr Childress, I agree with the assessment and plan, which were formulated with my direct input.   Ramsey Segovia MD PhD

## 2024-05-17 PROCEDURE — 1090000001 HH PPS REVENUE CREDIT

## 2024-05-17 PROCEDURE — 1090000002 HH PPS REVENUE DEBIT

## 2024-05-18 PROCEDURE — 1090000002 HH PPS REVENUE DEBIT

## 2024-05-18 PROCEDURE — 1090000001 HH PPS REVENUE CREDIT

## 2024-05-19 PROCEDURE — 1090000002 HH PPS REVENUE DEBIT

## 2024-05-19 PROCEDURE — 1090000001 HH PPS REVENUE CREDIT

## 2024-05-20 ENCOUNTER — HOME CARE VISIT (OUTPATIENT)
Dept: HOME HEALTH SERVICES | Facility: HOME HEALTH | Age: 74
End: 2024-05-20
Payer: MEDICARE

## 2024-05-20 PROCEDURE — G0152 HHCP-SERV OF OT,EA 15 MIN: HCPCS | Mod: HHH

## 2024-05-20 PROCEDURE — 1090000001 HH PPS REVENUE CREDIT

## 2024-05-20 PROCEDURE — 0023 HH SOC

## 2024-05-20 PROCEDURE — 1090000002 HH PPS REVENUE DEBIT

## 2024-05-20 ASSESSMENT — ENCOUNTER SYMPTOMS
PAIN: 1
PAIN LOCATION - EXACERBATING FACTORS: EXERCISE
PAIN LOCATION - RELIEVING FACTORS: REST
PAIN LOCATION - PAIN QUALITY: ACHING
PAIN LOCATION - PAIN DURATION: ACUTE
PAIN LOCATION - PAIN SEVERITY: 6/10
PAIN LOCATION: LEFT SHOULDER
PERSON REPORTING PAIN: PATIENT
PAIN LOCATION - PAIN FREQUENCY: FREQUENT

## 2024-05-21 ENCOUNTER — HOME CARE VISIT (OUTPATIENT)
Dept: HOME HEALTH SERVICES | Facility: HOME HEALTH | Age: 74
End: 2024-05-21
Payer: MEDICARE

## 2024-05-21 PROCEDURE — 1090000002 HH PPS REVENUE DEBIT

## 2024-05-21 PROCEDURE — 1090000001 HH PPS REVENUE CREDIT

## 2024-05-22 PROCEDURE — 1090000002 HH PPS REVENUE DEBIT

## 2024-05-22 PROCEDURE — 1090000001 HH PPS REVENUE CREDIT

## 2024-05-23 PROCEDURE — 1090000001 HH PPS REVENUE CREDIT

## 2024-05-23 PROCEDURE — 1090000002 HH PPS REVENUE DEBIT

## 2024-05-24 PROCEDURE — 1090000002 HH PPS REVENUE DEBIT

## 2024-05-24 PROCEDURE — 1090000001 HH PPS REVENUE CREDIT

## 2024-05-25 PROCEDURE — 1090000002 HH PPS REVENUE DEBIT

## 2024-05-25 PROCEDURE — 1090000001 HH PPS REVENUE CREDIT

## 2024-05-26 PROCEDURE — 1090000002 HH PPS REVENUE DEBIT

## 2024-05-26 PROCEDURE — 1090000001 HH PPS REVENUE CREDIT

## 2024-05-27 PROCEDURE — 1090000002 HH PPS REVENUE DEBIT

## 2024-05-27 PROCEDURE — 1090000001 HH PPS REVENUE CREDIT

## 2024-05-28 PROCEDURE — 1090000001 HH PPS REVENUE CREDIT

## 2024-05-28 PROCEDURE — 1090000002 HH PPS REVENUE DEBIT

## 2024-05-29 ENCOUNTER — HOME CARE VISIT (OUTPATIENT)
Dept: HOME HEALTH SERVICES | Facility: HOME HEALTH | Age: 74
End: 2024-05-29
Payer: MEDICARE

## 2024-05-29 VITALS
SYSTOLIC BLOOD PRESSURE: 112 MMHG | OXYGEN SATURATION: 99 % | DIASTOLIC BLOOD PRESSURE: 55 MMHG | TEMPERATURE: 98.7 F | HEART RATE: 88 BPM

## 2024-05-29 PROCEDURE — 1090000003 HH PPS REVENUE ADJ

## 2024-05-29 PROCEDURE — 1090000002 HH PPS REVENUE DEBIT

## 2024-05-29 PROCEDURE — G0152 HHCP-SERV OF OT,EA 15 MIN: HCPCS | Mod: HHH

## 2024-05-29 PROCEDURE — 1090000001 HH PPS REVENUE CREDIT

## 2024-05-29 ASSESSMENT — ACTIVITIES OF DAILY LIVING (ADL)
ADLS_COMMENTS: AND NIGHT, AND/OR IS INDEPENDENT WITH INTERNAL OR EXTERNAL DEVICES.     BATHING   0    TOTAL DEPENDENCE IN BATHING SELF.   1    ASSISTANCE IS REQUIRED IN ALL ASPECTS OF BATHING, BUT PATIENT IS ABLE TO MAKE SOME CONTRIBUTION.   3    ASSISTANCE IS REQ
ADLS_COMMENTS: NG   0    THE PATIENT IS UNABLE TO CLIMB STAIRS.   2    ASSISTANCE IS REQUIRED IN ALL ASPECTS OF CHAIR CLIMBING, INCLUDING ASSISTANCE WITH WALKING AIDS.   5    THE PATIENT IS ABLE TO ASCEND/DESCEND BUT IS UNABLE TO CARRY WALKING AIDS AND NEEDS SUPERV
ADLS_COMMENTS: THE PATIENT MAY USE A BATHTUB, A SHOWER, OR TAKE A COMPLETE SPONGE BATH. THE PATIENT MUST BE ABLE TO DO ALL THE STEPS OF WHICHEVER METHOD IS EMPLOYED WITHOUT ANOTHER PERSON BEING PRESENT.     DRESSING   0     THE PATIENT IS DEPENDENT IN ALL ASPECTS
ADLS_COMMENTS: TINENCE AIDS SUCH AS PAD, ETC. THE PATIENT MAY REQUIRE SUPERVISION WITH THE USE OF SUPPOSITORY OR ENEMA AND HAS OCCASIONAL ACCIDENTS.   10   THE PATIENT CAN CONTROL BOWELS AND HAS NO ACCIDENTS, CAN USE SUPPOSITORY, OR TAKE AN ENEMA WHEN NECESSARY.
HOME_HEALTH_OASIS: 00
ADLS_COMMENTS: CHAIR/BED TRANSFERS  0    UNABLE TO PARTICIPATE IN A TRANSFER. TWO ATTENDANTS ARE REQUIRED TO TRANSFER THE PATIENT WITH OR WITHOUT A MECHANICAL DEVICE.   3    ABLE TO PARTICIPATE BUT MAXIMUM ASSISTANCE OF ONE OTHER PERSON IS REQUIRE IN ALL ASPECTS OF
ADLS_COMMENTS: ET PAPER WITHOUT HELP. IF NECESSARY, THE PATIENT MAY USE A BED PAN OR COMMODE OR URINAL AT NIGHT, BUT MUST BE ABLE TO EMPTY IT AND CLEAN IT.     BOWEL CONTROL   0   THE PATIENT IS BOWEL INCONTINENT.   2   THE PATIENT NEEDS HELP TO ASSUME APPROPRIATE
ADLS_COMMENTS: INIMAL ASSISTANCE IS REQUIRED WITH FASTENING CLOTHING SUCH AS BUTTONS, ZIPS, BRA, SHOES, ETC.   10   THE PATIENT IS ABLE TO PUT ON, REMOVE, CORSET, BRACES, AS PRESCRIBED.     PERSONAL HYGIENE (GROOMING)   0    THE PATIENT IS UNABLE TO ATTEND TO PERSO
ADLS_COMMENTS: TRAY OR TABLE WHEN SOMEONE PUTS THE FOOD WITHIN REACH. THE PATIENT MUST PUT ON AN ASSISTIVE DEVICE IF NEEDED, CUT FOOD, AND IF DESIRED USE SALT AND PEPPER, SPREAD BUTTER, ETC.    SCORE  95/100    SCORE INTERPRETATION   TOTAL DEPENDENCE     00 - 20  S
ADLS_COMMENTS: EVERE DEPENDENCE     21 - 60  MODERATE DEPENDENCE     61 - 90  **SLIGHT DEPENDENCE      91 - 99  INDEPENDENCE        100    SCORE PREDICTION    LESS THAN 40    UNLIKELY TO GO HOME - DEPENDENT IN MOBILITY - DEPENDENT IN SELF CARE   60    PIVOTAL SCORE
ADLS_COMMENTS: RIC NEEDS TO BE ADMINISTERED.   2    CAN MANIPULATE AN EATING DEVICE, USUALLY A SPOON, BUT SOMEONE MUST PROVIDE ACTIVE ASSISTANCE DURING THE MEAL.   5    ABLE TO FEED SELF WITH SUPERVISION. ASSISTANCE IS REQUIRED WITH ASSOCIATED TASKS SUCH AS PUTTING
ADLS_COMMENTS: MILK/SUGAR INTO TEA, SALT, PEPPER, SPREADING BUTTER, TURNING A PLATE OR OTHER “SET UP” ACTIVITIES.   8    INDEP WITH PREPARED TRAY, MAY NEED MEAT CUT, MILK CARTON/JAR LID OPENED. ANOTHER PERSON IS NOT REQUIRED  10   THE PATIENT CAN FEED SELF FROM A
ADLS_COMMENTS: BLADDER CONTROL   0    THE PATIENT IS DEPENDENT IN BLADDER MANAGEMENT, IS INCONTINENT, OR HAS INDWELLING CATHETER.   2    THE PATIENT IS INCONTINENT BUT IS ABLE TO ASSIST WITH THE APPLICATION OF AN INTERNAL OR EXTERNAL DEVICE.   5    THE PATIENT IS
ADLS_COMMENTS: NCE OR SAFETY IN HAZARDOUS SITUATIONS.   15   THE PATIENT MUST BE ABLE TO WEAR BRACES IF REQUIRED, LOCK AND UNLOCK THESE BRACES ASSUME STANDING POSITION, SIT DOWN, AND PLACE THE NECESSARY AIDS INTO POSITION FOR USE. THE PATIENT MUST BE ABLE TO CRUTCH
ADLS_COMMENTS: AFETY.   15  THE PATIENT CAN SAFELY APPROACH THE BED WALKING OR IN A WHEELCHAIR, LOCK BRAKES, LIFT FOOTRESTS, OR POSITION WALKING AID, MOVE SAFELY TO BED, LIE DOWN, COME TO A SITTING POSITION ON THE SIDE OF THE BED, CHANGE THE POSITION OF THE WHEELCH
ADLS_COMMENTS: GENERALLY DRY BY DAY, BUT NOT AT NIGHT AND NEEDS SOME ASSISTANCE WITH THE DEVICES.   8    GENERALLY DRY BY DAY AND NIGHT, MAY HAVE OCCAS ACCIDENT OR NEED MIN ASSIST WITH INTERNAL OR EXTERNAL DEVICES.   10   THE PATIENT IS ABLE TO CONTROL BLADDER DAY
ADLS_COMMENTS: ANCE IS REQUIRED TO MANIPULATE CHAIR TO TABLE, BED, ETC.   4    THE PATIENT CAN PROPEL SELF FOR A REASONABLE DURATION OVER REGULARLY ENCOUNTERED TERRAIN. MINIMAL ASSISTANCE MAY STILL BE REQUIRED IN “TIGHT CORNERS” OR TO NEGOTIATE A KERB 100MM HIGH.
ADLS_COMMENTS: NAL HYGIENE AND IS DEPENDENT IN ALL ASPECTS.   1    ASSISTANCE IS REQUIRED IN ALL STEPS OF PERSONAL HYGIENE, BUT PATIENT ABLE TO MAKE SOME CONTRIBUTION.   3    SOME ASSISTANCE IS REQUIRED IN ONE OR MORE STEPS OF PERSONAL HYGIENE.   4    PATIENT IS AB
ADLS_COMMENTS: AIR, TRANSFER BACK INTO IT SAFELY AND/OR GRASP AID AND STAND. THE PATIENT MUST BE INDEPENDENT IN ALL PHASES OF THIS ACTIVITY.     AMBULATION    0    DEPENDENT IN AMBULATION  3    CONSTANT PRESENCE OF ONE OR MORE ASSISTANT IS REQUIRED DURING AMBULATIO
ADLS_COMMENTS: N.   8    ASSISTANCE IS REQUIRED WITH REACHING AIDS AND/OR THEIR MANIPULATION. ONE PERSON IS REQUIRED TO OFFER ASSISTANCE.   12   THE PATIENT IS INDEPENDENT IN AMBULATION BUT UNABLE TO WALK 50 METRES WITHOUT HELP, OR SUPERVISION IS NEEDED FOR CONFIDE
ADLS_COMMENTS: ES, CANES, OR A WALKARETTE, AND WALK 50 METRES WITHOUT HELP OR SUPERVISION.     AMBULATION/WHEELCHAIR * (IF UNABLE TO WALK) ONLY USE THIS ITEM IF THE PATIENT IS RATED “0” FOR AMBULATION, AND THEN ONLY IF THE PATIENT HAS BEEN TRAINED IN WHEELCHAIR MAN
ADLS_COMMENTS: 5    TO PROPEL WHEELCHAIR INDEPENDENTLY, THE PATIENT MUST BE ABLE TO GO AROUND CORNERS, TURN AROUND, MANOEUVRE THE CHAIR TO A TABLE, BED, TOILET, ETC. THE PATIENT MUST BE ABLE TO PUSH A CHAIR AT LEAST 50 METRES AND NEGOTIATE KERB.       STAIR CLIMBI
ADLS_COMMENTS: WHERE PATIENTS MOVE FROM DEPENDENCY TO ASSISTED INDEPENDENCE.   60 - 80    IF LIVING ALONE WILL PROBABLY NEED A NUMBER OF COMMUNITY SERVICES TO COPE.   **MORE THAN 85     LIKELY TO BE DISCHARGED TO COMMUNITY LIVING - INDEPENDENT IN TRANSFERS AND ABL
ADLS_COMMENTS: LE TO CONDUCT OWN PERSONAL HYGIENE BUT REQUIRES MIN ASSIST BEFORE AND/OR AFTER THE OPERATION.   5    THE PATIENT CAN WASH HIS/HER HANDS AND FACE, COMB HAIR, CLEAN TEETH AND SHAVE. A MALE PATIENT MAY USE ANY KIND OF RAZOR BUT MUST INSERT THE BLADE, OR
ADLS_COMMENTS: POSITION, AND WITH BOWEL MOVEMENT FACILITATORY TECHNIQUES.   5   THE PATIENT CAN ASSUME APPROPRIATE POSITION, BUT CANNOT USE FACILITATORY TECHNIQUES OR CLEAN SELF WITHOUT ASSISTANCE AND HAS FREQUENT ACCIDENTS.   8    ASSISTANCE IS REQUIRED WITH INCON
ADLS_COMMENTS: UIRED WITH EITHER TRANSFER TO SHOWER/BATH OR WITH WASHING OR DRYING; INCLUDING INABILITY TO COMPLETE A TASK BECAUSE OF CONDITION OR DISEASE, ETC.   4    SUPERVISION IS REQUIRED FOR SAFETY IN ADJUSTING THE WATER TEMPERATURE, OR IN THE TRANSFER.   5
ADLS_COMMENTS: EMENT OF CLOTHING, TRANSFERRING, OR WASHING HANDS.   8    SUP MAY BE REQUIRED FOR SAFETY WITH NORMAL TOILET. BSC MAY BE USED AT NIGHT, ASSIST FOR EMPTYING AND CLEANING.   10   THE PATIENT IS ABLE TO GET ON/OFF THE TOILET, FASTEN CLOTHING AND USE TOIL
ADLS_COMMENTS: PLUG IN THE RAZOR WITHOUT HELP, AS WELL AS RETRIEVE IT FROM THE DRAWER OR CABINET. A FEMALE PATIENT MUST APPLY HER OWN MAKE-UP, IF USED, BUT NEED NOT BRAID OR STYLE HER HAIR.     FEEDING    0    DEPENDENT IN ALL ASPECTS AND NEEDS TO BE FED, NASOGAST
ADLS_COMMENTS: OF DRESSING AND IS UNABLE TO PARTICIPATE IN THE ACTIVITY.   2     THE PATIENT IS ABLE TO PARTICIPATE TO SOME DEGREE, BUT IS DEPENDENT IN ALL ASPECTS OF DRESSING.   5     ASSISTANCE IS NEEDED IN PUTTING ON, AND/OR REMOVING ANY CLOTHING.   8     ONLY M
ADLS_COMMENTS: E TO WALK OR USE WHEELCHAIR INDEPENDENTLY.
ADLS_COMMENTS: THE TRANSFER.   8    THE TRANSFER REQUIRES THE ASSISTANCE OF ONE OTHER PERSON. ASSISTANCE MAY BE REQUIRED IN ANY ASPECT OF THE TRANSFER.   12  THE PRESENCE OF ANOTHER PERSON IS REQUIRED EITHER AS A CONFIDENCE MEASURE, OR TO PROVIDE SUPERVISION FOR S
ADLS_COMMENTS: AGEMENT.   0    DEPENDENT IN WHEELCHAIR AMBULATION.   1    PATIENT CAN PROPEL SELF SHORT DISTANCES ON FLAT SURFACE, BUT ASSISTANCE IS REQUIRED FOR ALL OTHER STEPS OF WHEELCHAIR MANAGEMENT.  3    PRESENCE OF ONE PERSON IS NECESSARY AND CONSTANT ASSIST
OASIS_M1830: 00
ADLS_COMMENTS: ISION AND ASSISTANCE.   8    GENERALLY NO ASSISTANCE IS REQUIRED. AT TIMES SUP IS REQUIRED FOR SAFETY DUE TO MORNING STIFFNESS, SOB, ETC  10   THE PATIENT IS ABLE TO GO UP/DOWN A FLIGHT OF STAIRS SAFELY WITHOUT HELP OR SUPERVISION,USE HAND RAILS, CAN

## 2024-05-29 ASSESSMENT — ENCOUNTER SYMPTOMS
DENIES PAIN: 1
PERSON REPORTING PAIN: PATIENT

## 2024-05-29 NOTE — HOME HEALTH
Discipline: OT    Date of Agency Discharge: 05/29/24    Reason for discharge: All goals met.     Evaluation of Goals: Patient demonstrates improved ADL status, including Mod I self-catheterization, dressing, and bathing. Barthel Index ([place] with ADLs): Improved to 95%. Five Times Sit-to-Stand Assessment (with UE support): Improved to 19.69 seconds.    Variable factors effecting response to treatment: impaired ADLs, impaired activity tolerance, decreased balance.    Summary of Care Provided: upper body strengthening, endurance training, safety during ADLs/IADLs, home safety.    Discharge Instructions Given: Continue HEP and compliance with learned home/fall prevention safety precautions.    Services Remaining: None    NOMNC obtained: Yes

## 2024-05-31 DIAGNOSIS — R51.9 CHRONIC DAILY HEADACHE: ICD-10-CM

## 2024-06-03 RX ORDER — NORTRIPTYLINE HYDROCHLORIDE 10 MG/1
20 CAPSULE ORAL NIGHTLY
Qty: 180 CAPSULE | Refills: 1 | Status: SHIPPED | OUTPATIENT
Start: 2024-06-03

## 2024-07-22 ENCOUNTER — APPOINTMENT (OUTPATIENT)
Dept: UROLOGY | Facility: CLINIC | Age: 74
End: 2024-07-22
Payer: MEDICARE

## 2024-07-22 VITALS
SYSTOLIC BLOOD PRESSURE: 121 MMHG | BODY MASS INDEX: 22.02 KG/M2 | HEIGHT: 64 IN | TEMPERATURE: 96.4 F | WEIGHT: 129 LBS | DIASTOLIC BLOOD PRESSURE: 72 MMHG | HEART RATE: 79 BPM

## 2024-07-22 DIAGNOSIS — K59.01 SLOW TRANSIT CONSTIPATION: ICD-10-CM

## 2024-07-22 DIAGNOSIS — N31.9 NEUROGENIC BLADDER: ICD-10-CM

## 2024-07-22 DIAGNOSIS — G35 MULTIPLE SCLEROSIS (MULTI): ICD-10-CM

## 2024-07-22 DIAGNOSIS — R33.9 INCOMPLETE BLADDER EMPTYING: ICD-10-CM

## 2024-07-22 DIAGNOSIS — N95.2 ATROPHIC VAGINITIS: ICD-10-CM

## 2024-07-22 DIAGNOSIS — N39.41 URGE INCONTINENCE: Primary | ICD-10-CM

## 2024-07-22 DIAGNOSIS — N39.0 RECURRENT UTI: ICD-10-CM

## 2024-07-22 LAB
POC APPEARANCE, URINE: CLEAR
POC BILIRUBIN, URINE: ABNORMAL
POC BLOOD, URINE: ABNORMAL
POC COLOR, URINE: YELLOW
POC GLUCOSE, URINE: NEGATIVE MG/DL
POC KETONES, URINE: ABNORMAL MG/DL
POC LEUKOCYTES, URINE: ABNORMAL
POC NITRITE,URINE: POSITIVE
POC PH, URINE: 5.5 PH
POC PROTEIN, URINE: ABNORMAL MG/DL
POC SPECIFIC GRAVITY, URINE: >=1.03
POC UROBILINOGEN, URINE: 0.2 EU/DL

## 2024-07-22 PROCEDURE — 99213 OFFICE O/P EST LOW 20 MIN: CPT | Performed by: OBSTETRICS & GYNECOLOGY

## 2024-07-22 PROCEDURE — 81003 URINALYSIS AUTO W/O SCOPE: CPT | Performed by: OBSTETRICS & GYNECOLOGY

## 2024-07-22 PROCEDURE — 1160F RVW MEDS BY RX/DR IN RCRD: CPT | Performed by: OBSTETRICS & GYNECOLOGY

## 2024-07-22 PROCEDURE — 1159F MED LIST DOCD IN RCRD: CPT | Performed by: OBSTETRICS & GYNECOLOGY

## 2024-07-22 PROCEDURE — 3008F BODY MASS INDEX DOCD: CPT | Performed by: OBSTETRICS & GYNECOLOGY

## 2024-07-22 PROCEDURE — 52287 CYSTOSCOPY CHEMODENERVATION: CPT | Performed by: OBSTETRICS & GYNECOLOGY

## 2024-07-22 PROCEDURE — 1126F AMNT PAIN NOTED NONE PRSNT: CPT | Performed by: OBSTETRICS & GYNECOLOGY

## 2024-07-22 PROCEDURE — 1036F TOBACCO NON-USER: CPT | Performed by: OBSTETRICS & GYNECOLOGY

## 2024-07-22 RX ORDER — NITROFURANTOIN 25; 75 MG/1; MG/1
100 CAPSULE ORAL 2 TIMES DAILY
Qty: 6 CAPSULE | Refills: 0 | Status: SHIPPED | OUTPATIENT
Start: 2024-07-22 | End: 2024-07-25

## 2024-07-22 ASSESSMENT — PAIN SCALES - GENERAL: PAINLEVEL: 0-NO PAIN

## 2024-07-22 NOTE — PATIENT INSTRUCTIONS
Please remember that you have a standing order for urinalysis and urine culture available at any [facility] lab.  You will be contacted with these results should you require therapy.    Please hold your daily Macrodantin now and start 100 mg nitrofurantoin twice daily for 3 days.  Please restart your Macrodantin thereafter.    Please continue your CIC use every 3-4 hours.    Please contact the clinic when you are ready for repeat 200 units Botox    Please contact the clinic with any questions or concerns    [phone]

## 2024-07-22 NOTE — PROGRESS NOTES
Subjective   Patient ID: Coby Bonner is a 73 y.o. female who presents for intradetrusor Botox.  HPI  73-year-old with chronic urinary tract infections, urinary incontinence, neurogenic bladder with longstanding history of MS having undergone InterStim stage II 10/19/2022 at the [facility] and 200 units of Botox 06/26/2023, 11/10/2023, 03/18/2024  and today 07/22/2024    The patients UA is positive for nitrites but she denies any other UTI like symptoms.     She has interrogated her device.     She denies any vaginal complaints, no abnormal bleeding or discharge.     She denies any bowel related complaints, no fecal or flatal incontinence.    She has no other complaints.     From Previous note    73-year-old with chronic urinary tract infections, urinary incontinence, neurogenic bladder with longstanding history of MS having undergone InterStim stage II 10/19/2022 at the [facility] and 200 units of Botox 06/26/2023, 11/10/2023 and today 03/18/2024.    She denies any vaginal complaints, no abnormal bleeding or discharge.     She denies any bowel related complaints, no fecal or flatal incontinence.    She has no other complaints.    From Previous note  73-year-old with chronic urinary tract infections, urinary incontinence, neurogenic bladder with longstanding history of MS having undergone InterStim stage II 10/19/2022 at the [facility] and 200 units of Botox 06/26/2023 and 11/10/2023.    The patient presents for Botox today, however she is not authorized for this. She will be authorized and rescheduled at her earliest convenience. She is noting worsening urgency and incontinence complaints. She denies any UTI like symptoms.     She denies any vaginal complaints, no abnormal bleeding or discharge.     She denies any bowel related complaints, no fecal or flatal incontinence.    She has no other complaints.      From Previous note   73-year-old with chronic urinary tract  infections, urinary incontinence, neurogenic bladder with longstanding history of MS having undergone InterStim stage II 10/19/2022 at the [facility] and 200 units of Botox 06/26/2023 with active urinary tract infection presenting for 200 units intradetrusor Botox.     She has no other complaints.  She has completed 7-day course of her Macrobid and has noted improvements in her UTI symptoms.  She presents today for Botox.    From Previous note    73-year-old with chronic urinary tract infections, urinary incontinence, neurogenic bladder with longstanding history of MS having undergone InterStim stage II 10/19/2022 at the [facility] and 200 units of Botox 06/26/2023 with a history of urinary tract infection. She presents today 10/30/2023 for repeat 200 units of Botox     Unfortunately the patient has an active infection today, urinalysis is grossly infected culture. She is currently utilizing Amoxicillin for a tooth abscess. She denies utilizing the pyridium. Her last UTI from 10/02/2023 was appropriately treated with Bactrim.      She has no other complaints.     From Previous note  72-year-old with chronic urinary tract infections, urinary incontinence, neurogenic bladder with longstanding history of MS having undergone InterStim stage II 10/19/2022 at the [facility] and 200 units of Botox 06/26/2023 presenting for 200 units of Botox today 10/02/2023.         The patient  appears to be having UTI like symptoms, she will be rescheduled at her earliest convenience.      She has no other complaints.        From Previous note  72-year-old with chronic urinary tract infections, urinary incontinence, neurogenic bladder with longstanding history of MS having undergone InterStim stage II 10/19/2022 at the [facility] and 200 units of Botox 06/26/2023.      She endorses relief from Botox injections. She reports experiencing leakage when she needs to urinate. She uses  her catheter at night and voids every 3 hours. Her Interstim is program 4 and 1.8 . She notes leaking when she waits greater than 4 hours. She has noted terminal incontinence with her first void of the morning.     She is overall very satisfied with her present therapy.     From previous note  72-year-old with chronic urinary tract infections, urinary incontinence, neurogenic bladder with longstanding history of MS having undergone InterStim stage II 10/19/2022 at the [facility] and 200 units of Botox today 06/26/2023.       She has no other complaints.      From previous note  72-year-old with chronic urinary tract infections, urinary incontinence, neurogenic bladder with longstanding history of MS having undergone InterStim stage II 10/19/2022 at the [facility].     The patient was last seen in November 2022. continues to be overall satisfied with her InterStim and CIC use. She has not made any adjustments to her InterStim device. She is utilizing her CIC every 2-3 hours, 5-7 times daily. However she does continue to notice incontinence complaints. She notes 1-2 episodes of nocturia., she states she empties her bladder before going to bed but notes urgency after 6 hours and has to rush to the bathroom and is particularly bothered by it. She denies any UTI like symptoms.      She complains of constipation, but denies fecal or flatal incontinence.     She denies any vaginal complaints, no abnormal vaginal bleeding or discharge.      She has no other complaints.         From previous note  72-year-old with chronic urinary tract infections, urinary incontinence, neurogenic bladder with longstanding history of MS having undergone InterStim stage II on 10/19/2022.     The patient reports she is over all very satisfied with Interstim Stage II. She reports improvement in her bladder symptoms. She denies any urinary incontinence.     She complains of constipation, but denies fecal or flatal  "incontinence.     She denies any vaginal complaints, no abnormal vaginal bleeding or discharge.      She has no other complaints.         From previous note  71-year-old with chronic urinary tract infections, urinary incontinence, neurogenic bladder with longstanding history of MS.     The patient has increased her CIC use to roughly every 2-3 hours. She is emptying her bladder of between 5 and 8 ounces of urine. However even with this she continues to note urgency and urge related incontinence in between these catheterizations. She continues to note bothersome nighttime urgency and incontinence as well.     She denies any UTI-like symptoms today.     She has no other complaints.     From previous note  71-year-old presenting as a referral from Dr. Lopez with longstanding history of multiple sclerosis and neurogenic bladder with recurrent urinary tract infections.     The patient presents today with her . She utilizes CIC 3 times a day at 10:00, 4:00 and 10:00. Outside of these episodes she notes leaking. She does have near nightly enuresis as well. She has been treated on multiple occasions with 300 units Botox. Patient was most recently treated in May 2022. She stated that she had \"a few weeks of relief\" before her symptoms returned. She does believe that she has a urinary tract infection. . She could not provide a urine sample today. PVR is 84. She does not have any catheter supplies with her and would rather provide a sample tomorrow when she is visiting her primary care physician.     She otherwise denies any abnormal vaginal bleeding or discharge. She does not desire a pelvic exam today. She is not sexually active. She does have defecatory urgency.     She has no other complaints.   Review of Systems    Objective   Physical Exam  PHYSICAL EXAMINATION:  No LMP recorded.  Body mass index is 22.81 kg/m².  /59   Pulse 86   Wt 60.3 kg (132 lb 14.4 oz)   BMI 22.81 kg/m²   General Appearance: well " appearing  Neuro: Alert and oriented   HEENT: mucous membranes moist, neck supple  Resp: No respiratory distress, normal work of breathing    After the patient was identified and consent was electronically signed, the patient was prepped with lidocaine and iodine.  A flexible cystoscope was then introduced into the bladder noting normal-appearing bladder mucosa and ureteral orifices in the normal orthotopic position.  Using an Olympus needle to a depth of 4 mm, 5 locations were injected across the posterior bladder wall using 2 cc aliquots of a mixture of 200 units Botox mixed into 10 cc of preservative-free normal saline.  There was no bleeding noted.  The bladder was then completely drained.  The patient tolerated the procedure well.      Assessment/Plan       73-year-old with chronic urinary tract infections, urinary incontinence, neurogenic bladder with longstanding history of MS having undergone InterStim stage II 10/19/2022 at the [facility] and 200 units of Botox 06/26/2023, 11/10/2023, 03/18/2024  and today 07/22/2024     #1 the patient has done well with her combined Botox and InterStim therapy in the past. The patient is overall very satisfied with her combined Botox and InterStim stage II device. She is presently on program 4 at 1.8 V. She has noted significant improvements in her urinary incontinence complaints. She has previously utilized 300 units Botox with Dr. Lopez with minimal long-term benefit. She continues to utilize her CIC roughly every 3 hours. She will continue her vaginal estrogen therapy. We discussed the importance of appropriate vaginal hygiene and not utilizing any soaps or irritants. She has had no bladder abnormalities noted on cystoscopic evaluation. We discussed the importance of timed voiding every 3-4 hours with her CIC.  She will  hold her Macrodantin at this time.  She will proceed with a 3-day course of Macrobid now.  She will restart her Macrodantin  thereafter.     #2 we discussed her bowel related complaints. We discussed potential for MiraLAX therapy instead of her milk of magnesia. We discussed the importance of fiber therapy. We discussed titrating her fiber and laxative use to a soft bowel movement every day to 2 days.  She has noted some worsening loose stool.  We discussed decreasing her MiraLAX and continuing her fiber.     #3 the patient will follow-up for 200 units Botox as needed moving forward.  She will continue her daily Macrodantin.  She will follow-up in 2 to 3 weeks should she have no significant benefits in her lower urinary tract symptoms.    FEDERICO Hays MD     Scribe Attestation  By signing my name below, I, Antonio Dowling attest that this documentation has been prepared under the direction and in the presence of Goyo Hays MD. All medical record entries made by the Scribe were at my direction or personally dictated by me. I have reviewed the chart and agree that the record accurately reflects my personal performance of the history, physical exam, discussion and plan.

## 2024-08-28 ENCOUNTER — TELEPHONE (OUTPATIENT)
Dept: UROLOGY | Facility: CLINIC | Age: 74
End: 2024-08-28

## 2024-09-08 DIAGNOSIS — H69.93 DYSFUNCTION OF BOTH EUSTACHIAN TUBES: ICD-10-CM

## 2024-09-10 RX ORDER — FLUTICASONE PROPIONATE 50 MCG
2 SPRAY, SUSPENSION (ML) NASAL DAILY
Qty: 48 ML | Refills: 1 | Status: SHIPPED | OUTPATIENT
Start: 2024-09-10

## 2024-10-14 ENCOUNTER — APPOINTMENT (OUTPATIENT)
Dept: UROLOGY | Facility: CLINIC | Age: 74
End: 2024-10-14
Payer: MEDICARE

## 2024-10-14 VITALS
HEART RATE: 93 BPM | HEIGHT: 64 IN | DIASTOLIC BLOOD PRESSURE: 75 MMHG | WEIGHT: 122 LBS | TEMPERATURE: 97 F | SYSTOLIC BLOOD PRESSURE: 127 MMHG | BODY MASS INDEX: 20.83 KG/M2

## 2024-10-14 DIAGNOSIS — N39.0 ACUTE UTI: ICD-10-CM

## 2024-10-14 DIAGNOSIS — N31.9 NEUROGENIC BLADDER: Primary | ICD-10-CM

## 2024-10-14 DIAGNOSIS — N39.0 RECURRENT UTI: ICD-10-CM

## 2024-10-14 DIAGNOSIS — G35 MULTIPLE SCLEROSIS (MULTI): ICD-10-CM

## 2024-10-14 DIAGNOSIS — N39.41 URGE INCONTINENCE: ICD-10-CM

## 2024-10-14 DIAGNOSIS — N95.2 ATROPHIC VAGINITIS: ICD-10-CM

## 2024-10-14 DIAGNOSIS — K59.01 SLOW TRANSIT CONSTIPATION: ICD-10-CM

## 2024-10-14 DIAGNOSIS — R33.9 INCOMPLETE BLADDER EMPTYING: ICD-10-CM

## 2024-10-14 LAB
POC APPEARANCE, URINE: CLEAR
POC APPEARANCE, URINE: CLEAR
POC BILIRUBIN, URINE: NEGATIVE
POC BILIRUBIN, URINE: NEGATIVE
POC BLOOD, URINE: ABNORMAL
POC BLOOD, URINE: ABNORMAL
POC COLOR, URINE: YELLOW
POC COLOR, URINE: YELLOW
POC GLUCOSE, URINE: NEGATIVE MG/DL
POC GLUCOSE, URINE: NEGATIVE MG/DL
POC KETONES, URINE: ABNORMAL MG/DL
POC KETONES, URINE: ABNORMAL MG/DL
POC LEUKOCYTES, URINE: ABNORMAL
POC LEUKOCYTES, URINE: ABNORMAL
POC NITRITE,URINE: POSITIVE
POC NITRITE,URINE: POSITIVE
POC PH, URINE: 6.5 PH
POC PH, URINE: 6.5 PH
POC PROTEIN, URINE: ABNORMAL MG/DL
POC PROTEIN, URINE: ABNORMAL MG/DL
POC SPECIFIC GRAVITY, URINE: >=1.03
POC SPECIFIC GRAVITY, URINE: >=1.03
POC UROBILINOGEN, URINE: 0.2 EU/DL
POC UROBILINOGEN, URINE: 0.2 EU/DL

## 2024-10-14 PROCEDURE — 99213 OFFICE O/P EST LOW 20 MIN: CPT | Performed by: OBSTETRICS & GYNECOLOGY

## 2024-10-14 PROCEDURE — 52287 CYSTOSCOPY CHEMODENERVATION: CPT | Performed by: OBSTETRICS & GYNECOLOGY

## 2024-10-14 PROCEDURE — 81003 URINALYSIS AUTO W/O SCOPE: CPT | Performed by: OBSTETRICS & GYNECOLOGY

## 2024-10-14 RX ORDER — ESTRADIOL 0.1 MG/G
CREAM VAGINAL
Qty: 42.5 G | Refills: 3 | Status: SHIPPED | OUTPATIENT
Start: 2024-10-14

## 2024-10-14 RX ORDER — NITROFURANTOIN MACROCRYSTALS 50 MG/1
CAPSULE ORAL
Qty: 90 CAPSULE | Refills: 3 | Status: SHIPPED | OUTPATIENT
Start: 2024-10-14

## 2024-10-14 ASSESSMENT — PAIN SCALES - GENERAL: PAINLEVEL: 0-NO PAIN

## 2024-10-14 NOTE — PATIENT INSTRUCTIONS
You will be scheduled  for repeat 200 units Botox January 2025.     Please use a fiber supplement that can include Metamucil, Citrucel, FiberCon, fiber biscuits, or fiber Gummies as well.     Please contact the clinic with any questions or concerns.     [phone]

## 2024-10-14 NOTE — PROGRESS NOTES
Subjective   Patient ID: Coby Bonner is a 74 y.o. female who presents for intradetrusor Botox.  HPI  73-year-old with chronic urinary tract infections, urinary incontinence, neurogenic bladder with longstanding history of MS having undergone InterStim stage II 10/19/2022 at the [facility] and 200 units of Botox 06/26/2023, 11/10/2023, 03/18/2024, 07/22/2024 and today 10/14/24    The patients UA is positive for nitrites but she denies any other UTI like symptoms. She is continuing her daily Macrodantin.      She has interrogated her device.     She denies any vaginal complaints, no abnormal bleeding or discharge.     She complaints of loose stool with  fecal  incontinence.    She has no other complaints.       From Previous note  73-year-old with chronic urinary tract infections, urinary incontinence, neurogenic bladder with longstanding history of MS having undergone InterStim stage II 10/19/2022 at the [facility] and 200 units of Botox 06/26/2023, 11/10/2023, 03/18/2024  and today 07/22/2024    The patients UA is positive for nitrites but she denies any other UTI like symptoms.     She has interrogated her device.     She denies any vaginal complaints, no abnormal bleeding or discharge.     She denies any bowel related complaints, no fecal or flatal incontinence.    She has no other complaints.     From Previous note    73-year-old with chronic urinary tract infections, urinary incontinence, neurogenic bladder with longstanding history of MS having undergone InterStim stage II 10/19/2022 at the [facility] and 200 units of Botox 06/26/2023, 11/10/2023 and today 03/18/2024.    She denies any vaginal complaints, no abnormal bleeding or discharge.     She denies any bowel related complaints, no fecal or flatal incontinence.    She has no other complaints.    From Previous note  73-year-old with chronic urinary tract infections, urinary incontinence, neurogenic bladder with  longstanding history of MS having undergone InterStim stage II 10/19/2022 at the [facility] and 200 units of Botox 06/26/2023 and 11/10/2023.    The patient presents for Botox today, however she is not authorized for this. She will be authorized and rescheduled at her earliest convenience. She is noting worsening urgency and incontinence complaints. She denies any UTI like symptoms.     She denies any vaginal complaints, no abnormal bleeding or discharge.     She denies any bowel related complaints, no fecal or flatal incontinence.    She has no other complaints.      From Previous note   73-year-old with chronic urinary tract infections, urinary incontinence, neurogenic bladder with longstanding history of MS having undergone InterStim stage II 10/19/2022 at the [facility] and 200 units of Botox 06/26/2023 with active urinary tract infection presenting for 200 units intradetrusor Botox.     She has no other complaints.  She has completed 7-day course of her Macrobid and has noted improvements in her UTI symptoms.  She presents today for Botox.    From Previous note    73-year-old with chronic urinary tract infections, urinary incontinence, neurogenic bladder with longstanding history of MS having undergone InterStim stage II 10/19/2022 at the [facility] and 200 units of Botox 06/26/2023 with a history of urinary tract infection. She presents today 10/30/2023 for repeat 200 units of Botox     Unfortunately the patient has an active infection today, urinalysis is grossly infected culture. She is currently utilizing Amoxicillin for a tooth abscess. She denies utilizing the pyridium. Her last UTI from 10/02/2023 was appropriately treated with Bactrim.      She has no other complaints.     From Previous note  72-year-old with chronic urinary tract infections, urinary incontinence, neurogenic bladder with longstanding history of MS having undergone InterStim stage II 10/19/2022  at the [facility] and 200 units of Botox 06/26/2023 presenting for 200 units of Botox today 10/02/2023.         The patient  appears to be having UTI like symptoms, she will be rescheduled at her earliest convenience.      She has no other complaints.        From Previous note  72-year-old with chronic urinary tract infections, urinary incontinence, neurogenic bladder with longstanding history of MS having undergone InterStim stage II 10/19/2022 at the [facility] and 200 units of Botox 06/26/2023.      She endorses relief from Botox injections. She reports experiencing leakage when she needs to urinate. She uses her catheter at night and voids every 3 hours. Her Interstim is program 4 and 1.8 . She notes leaking when she waits greater than 4 hours. She has noted terminal incontinence with her first void of the morning.     She is overall very satisfied with her present therapy.     From previous note  72-year-old with chronic urinary tract infections, urinary incontinence, neurogenic bladder with longstanding history of MS having undergone InterStim stage II 10/19/2022 at the [facility] and 200 units of Botox today 06/26/2023.       She has no other complaints.      From previous note  72-year-old with chronic urinary tract infections, urinary incontinence, neurogenic bladder with longstanding history of MS having undergone InterStim stage II 10/19/2022 at the [facility].     The patient was last seen in November 2022. continues to be overall satisfied with her InterStim and CIC use. She has not made any adjustments to her InterStim device. She is utilizing her CIC every 2-3 hours, 5-7 times daily. However she does continue to notice incontinence complaints. She notes 1-2 episodes of nocturia., she states she empties her bladder before going to bed but notes urgency after 6 hours and has to rush to the bathroom and is particularly bothered by it. She denies any  "UTI like symptoms.      She complains of constipation, but denies fecal or flatal incontinence.     She denies any vaginal complaints, no abnormal vaginal bleeding or discharge.      She has no other complaints.         From previous note  72-year-old with chronic urinary tract infections, urinary incontinence, neurogenic bladder with longstanding history of MS having undergone InterStim stage II on 10/19/2022.     The patient reports she is over all very satisfied with Interstim Stage II. She reports improvement in her bladder symptoms. She denies any urinary incontinence.     She complains of constipation, but denies fecal or flatal incontinence.     She denies any vaginal complaints, no abnormal vaginal bleeding or discharge.      She has no other complaints.         From previous note  71-year-old with chronic urinary tract infections, urinary incontinence, neurogenic bladder with longstanding history of MS.     The patient has increased her CIC use to roughly every 2-3 hours. She is emptying her bladder of between 5 and 8 ounces of urine. However even with this she continues to note urgency and urge related incontinence in between these catheterizations. She continues to note bothersome nighttime urgency and incontinence as well.     She denies any UTI-like symptoms today.     She has no other complaints.     From previous note  71-year-old presenting as a referral from Dr. Lopez with longstanding history of multiple sclerosis and neurogenic bladder with recurrent urinary tract infections.     The patient presents today with her . She utilizes CIC 3 times a day at 10:00, 4:00 and 10:00. Outside of these episodes she notes leaking. She does have near nightly enuresis as well. She has been treated on multiple occasions with 300 units Botox. Patient was most recently treated in May 2022. She stated that she had \"a few weeks of relief\" before her symptoms returned. She does believe that she has a urinary " tract infection. . She could not provide a urine sample today. PVR is 84. She does not have any catheter supplies with her and would rather provide a sample tomorrow when she is visiting her primary care physician.     She otherwise denies any abnormal vaginal bleeding or discharge. She does not desire a pelvic exam today. She is not sexually active. She does have defecatory urgency.     She has no other complaints.   Review of Systems    Objective   Physical Exam  PHYSICAL EXAMINATION:  No LMP recorded.  Body mass index is 22.81 kg/m².  /59   Pulse 86   Wt 60.3 kg (132 lb 14.4 oz)   BMI 22.81 kg/m²   General Appearance: well appearing  Neuro: Alert and oriented   HEENT: mucous membranes moist, neck supple  Resp: No respiratory distress, normal work of breathing    After the patient was identified and consent was electronically signed, the patient was prepped with lidocaine and iodine.  A flexible cystoscope was then introduced into the bladder noting normal-appearing bladder mucosa and ureteral orifices in the normal orthotopic position.  Using an Olympus needle to a depth of 4 mm, 5 locations were injected across the posterior bladder wall using 2 cc aliquots of a mixture of 200 units Botox mixed into 10 cc of preservative-free normal saline.  There was no bleeding noted.  The bladder was then completely drained.  The patient tolerated the procedure well.      Assessment/Plan       73-year-old with chronic urinary tract infections, urinary incontinence, neurogenic bladder with longstanding history of MS having undergone InterStim stage II 10/19/2022 at the [facility] and 200 units of Botox 06/26/2023, 11/10/2023, 03/18/2024, 07/22/2024 and today 10/14/24       #1 the patient has done well with her combined Botox and InterStim therapy in the past. The patient is overall very satisfied with her combined Botox and InterStim stage II device. She is presently on program 4 at 1.8 V. She has noted  significant improvements in her urinary incontinence complaints. She has previously utilized 300 units Botox with Dr. Lopez with minimal long-term benefit. She continues to utilize her CIC roughly every 3 hours. She will continue her vaginal estrogen therapy. We discussed the importance of appropriate vaginal hygiene and not utilizing any soaps or irritants. She has had no bladder abnormalities noted on cystoscopic evaluation. We discussed the importance of timed voiding every 3-4 hours with her CIC.  She will increase her Macrodantin to twice daily for the next 3 days.  She will then continue this daily thereafter.     #2 we discussed her bowel related complaints.  We discussed the importance of fiber therapy. We discussed titrating her fiber and titrating a laxative to a soft bowel movement every day to 2 days.  She has noted some worsening loose stool.  We discussed decreasing her MiraLAX and continuing her fiber.     #3 the patient will follow-up for 200 units Botox every 3 to 4 months moving forward.    FEDERICO Hays MD     Scribe Attestation  By signing my name below, ILinda Scribe attest that this documentation has been prepared under the direction and in the presence of Goyo Hays MD. All medical record entries made by the Scribe were at my direction or personally dictated by me. I have reviewed the chart and agree that the record accurately reflects my personal performance of the history, physical exam, discussion and plan.

## 2024-10-16 ENCOUNTER — APPOINTMENT (OUTPATIENT)
Dept: PRIMARY CARE | Facility: CLINIC | Age: 74
End: 2024-10-16
Payer: MEDICARE

## 2024-10-16 VITALS
SYSTOLIC BLOOD PRESSURE: 108 MMHG | DIASTOLIC BLOOD PRESSURE: 70 MMHG | WEIGHT: 121.4 LBS | OXYGEN SATURATION: 100 % | BODY MASS INDEX: 20.84 KG/M2 | HEART RATE: 78 BPM

## 2024-10-16 DIAGNOSIS — G35: ICD-10-CM

## 2024-10-16 DIAGNOSIS — E06.3 ACQUIRED AUTOIMMUNE HYPOTHYROIDISM: ICD-10-CM

## 2024-10-16 DIAGNOSIS — R51.9 CHRONIC DAILY HEADACHE: ICD-10-CM

## 2024-10-16 DIAGNOSIS — E55.9 VITAMIN D DEFICIENCY: ICD-10-CM

## 2024-10-16 DIAGNOSIS — E53.8 B12 DEFICIENCY: ICD-10-CM

## 2024-10-16 DIAGNOSIS — E46 PROTEIN-CALORIE MALNUTRITION, UNSPECIFIED SEVERITY (MULTI): Primary | ICD-10-CM

## 2024-10-16 DIAGNOSIS — H69.93 DYSFUNCTION OF BOTH EUSTACHIAN TUBES: ICD-10-CM

## 2024-10-16 DIAGNOSIS — R53.2: ICD-10-CM

## 2024-10-16 DIAGNOSIS — F41.9 ANXIETY DISORDER, UNSPECIFIED: ICD-10-CM

## 2024-10-16 DIAGNOSIS — E78.5 HYPERLIPIDEMIA, UNSPECIFIED HYPERLIPIDEMIA TYPE: ICD-10-CM

## 2024-10-16 DIAGNOSIS — N39.0 ACUTE UTI: ICD-10-CM

## 2024-10-16 DIAGNOSIS — G35 MULTIPLE SCLEROSIS (MULTI): ICD-10-CM

## 2024-10-16 RX ORDER — FLUTICASONE PROPIONATE 50 MCG
2 SPRAY, SUSPENSION (ML) NASAL DAILY
Qty: 48 ML | Refills: 1 | Status: SHIPPED | OUTPATIENT
Start: 2024-10-16

## 2024-10-16 RX ORDER — ROSUVASTATIN CALCIUM 10 MG/1
10 TABLET, COATED ORAL DAILY
Qty: 90 TABLET | Refills: 1 | Status: SHIPPED | OUTPATIENT
Start: 2024-10-16

## 2024-10-16 RX ORDER — PRIMIDONE 50 MG/1
100 TABLET ORAL 2 TIMES DAILY
Status: CANCELLED | OUTPATIENT
Start: 2024-10-16

## 2024-10-16 ASSESSMENT — PATIENT HEALTH QUESTIONNAIRE - PHQ9
2. FEELING DOWN, DEPRESSED OR HOPELESS: NOT AT ALL
SUM OF ALL RESPONSES TO PHQ9 QUESTIONS 1 AND 2: 0
1. LITTLE INTEREST OR PLEASURE IN DOING THINGS: NOT AT ALL

## 2024-10-16 NOTE — PROGRESS NOTES
Subjective   Reason for Visit: Coby Bonner is an 74 y.o. female here for a 6-month follow-up       Reviewed all medications by prescribing practitioner or clinical pharmacist (such as prescriptions, OTCs, herbal therapies and supplements) and documented in the medical record.    HPI  Healthcare maintenance updates  Shingrix: UTD PCV13:12/2016 PPSV23 9/2020, Flu vaccine UTD, RSV UTD  COVID-19 vaccine 4 doses of pfizer, up-to-date and willing to get the next booster in the near future  Mammogram complete 1/2024, BI-RADS 2, yearly screenings recommended  Last colonoscopy ten years ago, Cologuard in 04/2023 was negative with 3 year-clearance,  Due 2026. denies family history of colon cancer.   Reports a healthy, balanced diet and limited activity due to MS.   Denies tobacco or etoh use.  DEXA scan performed and showed osteoporosis    2. Hyperlipidemia  Currently taking rosuvastatin 10 mg daily.  Last lipid panel in Jan 2024 within normal limits.  CT Cardiac Calcium score completed December 2020 with score of 523.13.  Follows with Dr. Weller as indicated in the chart    3. Post nasal drip/Eustachian Tube Dysfunction   Uses fluticasone spray daily.   We gave her azelastine at her last visit and she states she does not like that medication and has discontinued it at this point  Exacerbation of symptoms with nasal dripping continuously in the back of her throat    4. Functional quadriplegia secondary to multiple sclerosis  Follows with neurology, Dr. Segovia.  Reports feeling stable currently.   Continues to take Baclofen, Nortriptyline, Primidone  Has an appointment in about a month  She believes she is doing better and is able to do activities around the house such as sweeping the kitchen and doing laundry    5. Recurrent Urinary Tract Infections/ Hyperactive neurogenic bladder  Follows with urology for frequent UTIs and neurogenic bladder.  Self-caths 6 plus times daily.   Appears that she is now taking Macrobid on a  scheduled basis for UTI prophylaxis  Feels well at this time    6. Dermatitis   Has a history of a dermatitis in the past  At that time she was provided triamcinolone  Feels well without any issues/complaints  Previously saw derm for basal cell removal.  Currently no issues at this time and does not need refills    7. Insomnia  Complaining insomnia for months.   Denies difficulty falling asleep, but will awaken in the night with the urge to urinate and is unable to fall asleep afterwards.  Reports ~4 hours of sleep nightly.  This has been improving since she has been following up with the your gynecologist and even getting Botox injections    8. Acquired autoimmune hypothyroidism    Has a history of elevated TSH of 4.04 in October of 2020   Denies any signs/symptoms of hypothyroidism  Her last TSH was done earlier this year at 1.38    9. Vitamin B12 Deficiency  Last Vitamin B12 level was elevated 3315 in 03/2023  Was instructed to gradually lower the dose of B12 supplement  Currently taking B12 supplements daily.  Repeat B12 was well within normal limits at 366    10. Vitamin D Deficiency   Continues to take vitamin D supplementation    11.  Skin irritation  Patient noticed a change in color in the right lateral leg after spending sometime to a heating place . Denies any pain.  Wishes to discuss this further    No Known Allergies    Immunization History   Administered Date(s) Administered    Flu vaccine, quadrivalent, high-dose, preservative free, age 65y+ (FLUZONE) 12/12/2023, 10/02/2024    Flu vaccine, trivalent, preservative free, HIGH-DOSE, age 65y+ (Fluzone) 10/10/2016, 10/14/2017, 10/01/2018, 10/02/2024    Influenza, Seasonal, Quadrivalent, Adjuvanted 10/10/2021, 11/01/2022    Influenza, seasonal, injectable 11/01/2022    Pfizer COVID-19 vaccine, 12 years and older, (30mcg/0.3mL) (Comirnaty) 11/27/2023    Pfizer COVID-19 vaccine, bivalent, age 12 years and older (30 mcg/0.3 mL) 11/01/2022    Pfizer Purple Cap  SARS-CoV-2 03/11/2021, 04/01/2021, 10/10/2021    Pneumococcal conjugate vaccine, 13-valent (PREVNAR 13) 12/01/2016    Pneumococcal polysaccharide vaccine, 23-valent, age 2 years and older (PNEUMOVAX 23) 09/01/2020, 09/17/2020    RESPIRATORY SYNCYTIAL VIRUS (RSV), ELIGIBLE PREGNANT PTS, 0.5 ML (ABRYSVO) 09/30/2023    Tdap vaccine, age 7 year and older (BOOSTRIX, ADACEL) 03/15/2022    Zoster vaccine, recombinant, adult (SHINGRIX) 01/01/2018, 08/08/2018, 10/30/2018         Review of Systems  All pertinent positive symptoms are included in the history of present illness.    All other systems have been reviewed and are negative and noncontributory to this patient's current ailments.    Objective   Vitals:  /70 (BP Location: Left arm, Patient Position: Sitting, BP Cuff Size: Adult)   Pulse 78   Wt 55.1 kg (121 lb 6.4 oz)   SpO2 100%   BMI 20.84 kg/m²     Procedure Visit on 10/14/2024   Component Date Value Ref Range Status    POC Color, Urine 10/14/2024 Yellow  Straw, Yellow, Light-Yellow Final    POC Appearance, Urine 10/14/2024 Clear  Clear Final    POC Glucose, Urine 10/14/2024 NEGATIVE  NEGATIVE mg/dl Final    POC Bilirubin, Urine 10/14/2024 NEGATIVE  NEGATIVE Final    POC Ketones, Urine 10/14/2024 15 (1+) (A)  NEGATIVE mg/dl Final    POC Specific Gravity, Urine 10/14/2024 >=1.030  1.005 - 1.035 Final    POC Blood, Urine 10/14/2024 TRACE-Intact (A)  NEGATIVE Final    POC PH, Urine 10/14/2024 6.5  No Reference Range Established PH Final    POC Protein, Urine 10/14/2024 TRACE (A)  NEGATIVE, 30 (1+) mg/dl Final    POC Urobilinogen, Urine 10/14/2024 0.2  0.2, 1.0 EU/DL Final    Poc Nitrite, Urine 10/14/2024 POSITIVE (A)  NEGATIVE Final    POC Leukocytes, Urine 10/14/2024 LARGE (3+) (A)  NEGATIVE Final    POC Color, Urine 10/14/2024 Yellow  Straw, Yellow, Light-Yellow Final    POC Appearance, Urine 10/14/2024 Clear  Clear Final    POC Glucose, Urine 10/14/2024 NEGATIVE  NEGATIVE mg/dl Corrected    POC Bilirubin,  Urine 10/14/2024 NEGATIVE  NEGATIVE Final    POC Ketones, Urine 10/14/2024 15 (1+) (A)  NEGATIVE mg/dl Corrected    POC Specific Gravity, Urine 10/14/2024 >=1.030  1.005 - 1.035 Final    POC Blood, Urine 10/14/2024 TRACE-Intact (A)  NEGATIVE Corrected    POC PH, Urine 10/14/2024 6.5  No Reference Range Established PH Corrected    POC Protein, Urine 10/14/2024 TRACE (A)  NEGATIVE, 30 (1+) mg/dl Final    POC Urobilinogen, Urine 10/14/2024 0.2  0.2, 1.0 EU/DL Final    Poc Nitrite, Urine 10/14/2024 POSITIVE (A)  NEGATIVE Corrected    POC Leukocytes, Urine 10/14/2024 LARGE (3+) (A)  NEGATIVE Corrected   Office Visit on 07/22/2024   Component Date Value Ref Range Status    POC Color, Urine 07/22/2024 Yellow  Straw, Yellow, Light-Yellow Final    POC Appearance, Urine 07/22/2024 Clear  Clear Final    POC Glucose, Urine 07/22/2024 NEGATIVE  NEGATIVE mg/dl Final    POC Bilirubin, Urine 07/22/2024 SMALL (1+) (A)  NEGATIVE Final    POC Ketones, Urine 07/22/2024 15 (1+) (A)  NEGATIVE mg/dl Final    POC Specific Gravity, Urine 07/22/2024 >=1.030  1.005 - 1.035 Final    POC Blood, Urine 07/22/2024 LARGE (3+) (A)  NEGATIVE Final    POC PH, Urine 07/22/2024 5.5  No Reference Range Established PH Final    POC Protein, Urine 07/22/2024 100 (2+) (A)  NEGATIVE, 30 (1+) mg/dl Final    POC Urobilinogen, Urine 07/22/2024 0.2  0.2, 1.0 EU/DL Final    Poc Nitrite, Urine 07/22/2024 POSITIVE (A)  NEGATIVE Final    POC Leukocytes, Urine 07/22/2024 SMALL (1+) (A)  NEGATIVE Final       Physical Exam  CONSTITUTIONAL - well nourished, well developed, looks like stated age, in no acute distress, not ill-appearing, and not tired appearing  SKIN - dry skin noted  HEAD - no trauma, normocephalic  EYES - extraocular muscles are intact, and normal external exam  ENT -  uvula midline, normal tongue movement and throat normal, no exudate  NECK - supple without rigidity, no neck mass was observed, no thyromegaly or thyroid nodules  CHEST - clear to  auscultation, no wheezing, no crackles and no rales, good effort  CARDIAC - regular rate and regular rhythm, no skipped beats, no murmur  ABDOMEN - no organomegaly, soft, nontender, nondistended, no guarding/rebound/rigidity,   EXTREMITIES - +1 bilateral pitting edema, no deformities, mild scoliosis present  NEUROLOGICAL - abnormal/slow gait, essential tremor, irregular and abnormal balance, uses a walker   PSYCHIATRIC - alert, pleasant and cordial, age-appropriate  IMMUNOLOGIC - no cervical lymphadenopathy    Assessment/Plan     Healthcare maintenance updates  Formal physical examination/MCR will be performed in April 2025    Hyperlipidemia  Continue taking rosuvastatin 10 mg daily.  Refill have been sent to your pharmacy.  Stable without any changes  Continue to follow with Dr. Weller as needed.  The 10-year ASCVD risk score (Gale RUSHING, et al., 2019) is: 9%    Values used to calculate the score:      Age: 74 years      Sex: Female      Is Non- : No      Diabetic: No      Tobacco smoker: No      Systolic Blood Pressure: 108 mmHg      Is BP treated: No      HDL Cholesterol: 90.7 mg/dL      Total Cholesterol: 176 mg/dL  Cardiovascular risk discussed and, if needed, lifestyle modifications recommended, including nutritional choices, exercise, and elimination of habits contributing to risk.    We agreed on a plan to reduce the current cardiovascular risk    Nasal drip/Eustachian Tube Dysfunction  Use fluticasone spray daily  Please let us know if this worsens or causes nausea as previously and please notify the office and I will direct you to an otolaryngologist    Functional quadriplegia secondary to multiple sclerosis  Continue to follow with neurology Dr. Segovia.   Continue to take Baclofen, Nortriptyline, Primidone as he prescribes.  Please contact his office for a refill of Primidone  I also placed a requisition for home health care  They should be contacting you in the near  future    Recurrent Urinary Tract Infections/ Hyperactive neurogenic bladder  Continue to follow with urology.  Stable, no changes in recommendations.    Dermatitis   Continue to follow with dermatology or our office as needed  No further recommendations at this time    Insomnia  Continue to follow up with neurologist for treatment recommendations regarding this.    Acquired autoimmune hypothyroidism  Lab work has been stable  Will continue monitoring every year    Vitamin B12 Deficiency  Vitamin B12 within normal limits  Continue to take your Vitamin B12 supplement.    Vitamin D Deficiency   Vitamin D within normal limits  Continue to take your Vitamin D supplement.    11.  Skin irritation  I recommend you continue monitoring and if this worsens we will discuss a dermatology referral    Thank you for entrusting us with your healthcare needs.   Should you have any questions or concerns, please do not hesitate to contact our office.

## 2024-11-14 ENCOUNTER — OFFICE VISIT (OUTPATIENT)
Dept: NEUROLOGY | Facility: CLINIC | Age: 74
End: 2024-11-14
Payer: MEDICARE

## 2024-11-14 VITALS
RESPIRATION RATE: 18 BRPM | HEART RATE: 84 BPM | SYSTOLIC BLOOD PRESSURE: 93 MMHG | DIASTOLIC BLOOD PRESSURE: 52 MMHG | BODY MASS INDEX: 20.43 KG/M2 | WEIGHT: 119 LBS

## 2024-11-14 DIAGNOSIS — G35 MULTIPLE SCLEROSIS (MULTI): ICD-10-CM

## 2024-11-14 DIAGNOSIS — R51.9 CHRONIC DAILY HEADACHE: ICD-10-CM

## 2024-11-14 PROCEDURE — 1159F MED LIST DOCD IN RCRD: CPT | Performed by: PSYCHIATRY & NEUROLOGY

## 2024-11-14 PROCEDURE — 1126F AMNT PAIN NOTED NONE PRSNT: CPT | Performed by: PSYCHIATRY & NEUROLOGY

## 2024-11-14 PROCEDURE — 99214 OFFICE O/P EST MOD 30 MIN: CPT | Mod: GC | Performed by: PSYCHIATRY & NEUROLOGY

## 2024-11-14 PROCEDURE — 99214 OFFICE O/P EST MOD 30 MIN: CPT | Performed by: PSYCHIATRY & NEUROLOGY

## 2024-11-14 RX ORDER — GABAPENTIN 800 MG/1
800 TABLET ORAL 3 TIMES DAILY
Qty: 90 TABLET | Refills: 5 | Status: SHIPPED | OUTPATIENT
Start: 2024-11-14 | End: 2025-05-13

## 2024-11-14 RX ORDER — BACLOFEN 10 MG/1
10 TABLET ORAL 4 TIMES DAILY
Qty: 120 TABLET | Refills: 5 | Status: SHIPPED | OUTPATIENT
Start: 2024-11-14 | End: 2025-05-13

## 2024-11-14 RX ORDER — PRIMIDONE 50 MG/1
100 TABLET ORAL 2 TIMES DAILY
Qty: 120 TABLET | Refills: 5 | Status: SHIPPED | OUTPATIENT
Start: 2024-11-14 | End: 2025-05-13

## 2024-11-14 RX ORDER — NORTRIPTYLINE HYDROCHLORIDE 10 MG/1
20 CAPSULE ORAL NIGHTLY
Qty: 180 CAPSULE | Refills: 1 | Status: SHIPPED | OUTPATIENT
Start: 2024-11-14

## 2024-11-14 ASSESSMENT — PAIN SCALES - GENERAL: PAINLEVEL_OUTOF10: 0-NO PAIN

## 2024-11-14 NOTE — PROGRESS NOTES
Subjective   Coby Bonner is a 73 y.o. RH female presents to clinic for continued management of MS. Patient is a prior patient of Dr. Harding, had been following with him since at least 2008.     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  2010 - neuropathic leg pain on gabapentin 800TID, and baclofen 20 QID for spasticity. Primidione at bedtime. Continued L hemiplegia and spastic paraparesis. Titubations of head.  2014 - more speech difficulty.   History of vision loss, painful vision, double vision, sudden onset numbness or weakness, hot showers/hot weather causing symptoms, balance issues  2016 - episode of lymphopenia, had 2 month drug holiday. Urinary retention, adjusting oxybutynin dose.   2019 - recurrent UTIs. Seeing urology and ID. Stopped Tecfidera due to persistent leukopenia. Underwent botox for neurogenic baldder.   2020- off DMT for most of the year, no new reported symptoms or disability.   2022- remained off DMT. More falls, legs weaker. Neurogenic bladder (Interstim stage II device placed), self-caths   2023 - Progression in gait instability.      Interval Hx:  Reports she is doing okay. Patient endorses there are better days than others.   Due to limitation in mobility and decreased ability of her  to care for her, she was started with home health care by her PCP, but this is finished  "(patient reports she was too good for them to keep coming). Does not have PT/OT at this time. Patient tries to keep as active as possible.   No trouble swallowing.  She does have insomnia intermittently, waking up too early. Goes to sleep without issues, but just wakes up early. Sleeping 4-5hrs at night, naps 2-3hrs each day.   Taking nortriptyline 20mg at bedtime, citalopram 20mg daily, primidone 50mg BID, gabapentin 800mg TID, baclofen 10mg QID. Happy with her symptomatic medications.   Baclofen helps a lot with her spasticity.  Patient endorses that she itches a lot. Occurs mostly on both sides of her middle back.  endorses that there were hives and scratches. Denies vesicles. Has some itching on hands but they think it is from cleaning and washing hands.  Denies new unilateral weakness, numbness/tingling, infectious symptoms.    Objective   Vitals:    11/14/24 1437   BP: 93/52   Pulse: 84   Resp: 18       Neurological Exam  Physical Exam  Physical Exam:  General: No acute distress  Lungs: No increased work of breathing  Extremities: ROM intact    Neurological Exam:  MENTAL STATUS:  General appearance: No acute distress, happy  Orientation: A&Ox3  Language: Expression, repetition, naming, comprehension intact  Follows complex commands across midline    CRANIAL NERVES:  - II/III: PERRL  - II:  Visual fields intact to confrontation bilaterally tested individually and together  - III, IV, VI: EOM full to pursuit without nystagmus  - V: V1-V3 sensation intact bilaterally  - VII: Face muscles symmetric with smile and eye closure  - VIII: Intact to interview  - IX, X: Palate elevated symmetrically bilaterally, no hoarseness  - XI: 5/5 strength on shoulder shrugging bilaterally  - XII: Tongue midline without atrophy or fasciculation    MOTOR: Slightly increased tone in BLE. \"No-no\" head tremor stable from prior visit    STRENGTH:  Deltoid: R5-L5-  Biceps: R5-L5-   Triceps: R4L4  Wrist Flex: R4L4  Wrist Ext: " R4L4  Finger Abd: R5L5     Hip Flex: R4-L3-  Knee Flex: R4+L4+  Knee Ext: R4L3+  DorsiFlex: R4L4  PlantarFlex: R4L4    REFLEXES: R L  Biceps  +2 +2  Triceps  +2 +2  Brachioradialis +2 +2  Patellar  +2 +2  Achilles +2 +2    No Mcarthur, crossed adductors, Babinski, or clonus    COORDINATION: Intact on finger to nose bl, heel to shin limited due to BLE weakness  SENSORY: Intact to light touch in bl UE and LE    Walk test (with walker) 21.54s  Peg test  R 30.35s  L 35.55s    Assessment/Plan   Ms Bonner, 73 RH F, presents to clinic for continued management of MS. Patient is a prior patient of Dr. Harding, had been following with him since at least 2008. She was diagnosed with RRMS in 1998 and tried several medications (copaxone, avonex), most recently tecfidera, but stopped in 2019 due leukopenia. Repeat MRI in 3/2023 has demonstrated increasing disease burden with several new demyelinating lesions (none active), however compared with a 2004 MRI. Patients clinical hx and imaging consistent with SPMS. Current symptoms include gait instability and lower extremity weakness affecting mobility. At this time, no approved therapies for this stage of disease. Symptomatic management.      Plan:  - continue baclofen 10mg QID for spasticity (refilled)  - continue primidone 50mg QID for head tremor (refilled)  - continue gabapentin 800TID for neuropathic leg pain (refilled)  - continue nortriptyline 20mg nightly (refilled)  - continue citalopram 20mg daily   - follow up in 1 year with Dr Segovia     Plan discussed with attending Dr Segovia.    Cherry Hussein MD  Department of Neurology, PGY-2

## 2024-11-14 NOTE — PATIENT INSTRUCTIONS
Hello! You were here for MS follow up. Here is our plan for today:  - We placed refills for baclofen, primidone, gabapentin, nortriptyline  - Follow up in one year with Dr Segovia

## 2025-01-14 ENCOUNTER — TELEPHONE (OUTPATIENT)
Dept: UROLOGY | Facility: CLINIC | Age: 75
End: 2025-01-14
Payer: MEDICARE

## 2025-01-29 ENCOUNTER — HOSPITAL ENCOUNTER (OUTPATIENT)
Dept: RADIOLOGY | Facility: CLINIC | Age: 75
Discharge: HOME | End: 2025-01-29
Payer: MEDICARE

## 2025-01-29 VITALS — HEIGHT: 64 IN | WEIGHT: 119.05 LBS | BODY MASS INDEX: 20.32 KG/M2

## 2025-01-29 DIAGNOSIS — Z12.31 ENCOUNTER FOR SCREENING MAMMOGRAM FOR MALIGNANT NEOPLASM OF BREAST: ICD-10-CM

## 2025-01-29 PROCEDURE — 77067 SCR MAMMO BI INCL CAD: CPT | Performed by: RADIOLOGY

## 2025-01-29 PROCEDURE — 77067 SCR MAMMO BI INCL CAD: CPT

## 2025-01-29 PROCEDURE — 77063 BREAST TOMOSYNTHESIS BI: CPT | Performed by: RADIOLOGY

## 2025-02-01 DIAGNOSIS — F41.9 ANXIETY DISORDER, UNSPECIFIED: ICD-10-CM

## 2025-02-03 RX ORDER — CITALOPRAM 20 MG/1
20 TABLET, FILM COATED ORAL DAILY
Qty: 90 TABLET | Refills: 3 | Status: SHIPPED | OUTPATIENT
Start: 2025-02-03

## 2025-02-13 ENCOUNTER — APPOINTMENT (OUTPATIENT)
Dept: UROLOGY | Facility: CLINIC | Age: 75
End: 2025-02-13
Payer: MEDICARE

## 2025-02-26 NOTE — PROGRESS NOTES
Subjective   Patient ID: Coby Bonner is a 74 y.o. female    HPI  74 y.o. female who presents to establish with Neurogenic bladder. She previously followed Dr. Hays for chronic urinary tract infections, urinary incontinence, neurogenic bladder with longstanding history of MS having undergone InterStim stage II 10/19/2022 at the [facility] and 200 units of Botox 06/26/2023, 11/10/2023, 03/18/2024, 07/22/2024, and 10/14/2024. She is here today wishing to continue her Botox injections.      The most recent POCT UA, conducted on 10/14/2024, revealed:  POC Blood, Urine      POC Leukocytes, Urine                                          TRACE-Intact                LARGE (3+) Abnormal                                    Review of Systems    All systems were reviewed. Anything negative was noted in the HPI.    Objective   Physical Exam    General: Well developed, well nourished, alert and cooperative, appears in no acute distress   Eyes: Non-injected conjunctiva, sclera clear, no proptosis   Cardiac: Extremities are warm and well perfused. No edema, cyanosis or pallor   Lungs: Breathing is easy, non-labored. Speaking in clear and complete sentences. Normal diaphragmatic movement   MSK: Ambulatory with steady gait, unassisted   Neuro: Alert and oriented to person, place, and time   Psych: Demonstrates good judgment and reason, without hallucinations, abnormal affect or abnormal behaviors   Skin: No obvious lesions, no rashes       No CVA tenderness bilaterally   No suprapubic pain or discomfort       Past Medical History:   Diagnosis Date    Dysarthria and anarthria     Dysarthria    Other conditions influencing health status     A Fall    Other specified disorders of bladder     Spasm of bladder       Past Surgical History:   Procedure Laterality Date    CATARACT EXTRACTION  08/25/2014    Cataract Surgery    COLONOSCOPY  06/29/2015    Complete Colonoscopy    GALLBLADDER SURGERY  09/01/2016     Gallbladder Surgery    OTHER SURGICAL HISTORY  11/21/2022    Percutaneous insertion of neurostimulator electrode into sacral nerve       Assessment/Plan   Neurogenic bladder    74 y.o. female who presents for the above condition, We had a very long and extensive discussion with the patient regarding the pathophysiology, differential diagnosis, risk factor, management, natural history, incidence and diagnostic work-up of the condition.      Plan:  - Referred to Dr. Denney for Botox injections    E&M visit today is associated with current or anticipated ongoing medical care services related to a patient's single, serious condition or a complex condition.     2/27/2025    Scribe Attestation  By signing my name below, Sarah TORRES Scribe attest that this documentation has been prepared under the direction and in the presence of Dr. Juice Rivas.

## 2025-02-27 ENCOUNTER — OFFICE VISIT (OUTPATIENT)
Dept: UROLOGY | Facility: HOSPITAL | Age: 75
End: 2025-02-27
Payer: MEDICARE

## 2025-02-27 DIAGNOSIS — N32.81 OAB (OVERACTIVE BLADDER): Primary | ICD-10-CM

## 2025-02-27 PROCEDURE — 99214 OFFICE O/P EST MOD 30 MIN: CPT | Performed by: STUDENT IN AN ORGANIZED HEALTH CARE EDUCATION/TRAINING PROGRAM

## 2025-02-27 PROCEDURE — G2211 COMPLEX E/M VISIT ADD ON: HCPCS | Performed by: STUDENT IN AN ORGANIZED HEALTH CARE EDUCATION/TRAINING PROGRAM

## 2025-02-27 PROCEDURE — 1159F MED LIST DOCD IN RCRD: CPT | Performed by: STUDENT IN AN ORGANIZED HEALTH CARE EDUCATION/TRAINING PROGRAM

## 2025-02-27 PROCEDURE — 99204 OFFICE O/P NEW MOD 45 MIN: CPT | Performed by: STUDENT IN AN ORGANIZED HEALTH CARE EDUCATION/TRAINING PROGRAM

## 2025-03-19 DIAGNOSIS — G35 MULTIPLE SCLEROSIS (MULTI): ICD-10-CM

## 2025-03-19 RX ORDER — PRIMIDONE 50 MG/1
100 TABLET ORAL 2 TIMES DAILY
Qty: 360 TABLET | Refills: 1 | Status: SHIPPED | OUTPATIENT
Start: 2025-03-19 | End: 2025-09-15

## 2025-04-01 ENCOUNTER — PROCEDURE VISIT (OUTPATIENT)
Facility: HOSPITAL | Age: 75
End: 2025-04-01
Payer: MEDICARE

## 2025-04-01 VITALS — RESPIRATION RATE: 22 BRPM | DIASTOLIC BLOOD PRESSURE: 58 MMHG | SYSTOLIC BLOOD PRESSURE: 135 MMHG

## 2025-04-01 DIAGNOSIS — N32.81 OAB (OVERACTIVE BLADDER): ICD-10-CM

## 2025-04-01 PROCEDURE — 2500000004 HC RX 250 GENERAL PHARMACY W/ HCPCS (ALT 636 FOR OP/ED): Performed by: STUDENT IN AN ORGANIZED HEALTH CARE EDUCATION/TRAINING PROGRAM

## 2025-04-01 PROCEDURE — 96372 THER/PROPH/DIAG INJ SC/IM: CPT | Performed by: STUDENT IN AN ORGANIZED HEALTH CARE EDUCATION/TRAINING PROGRAM

## 2025-04-01 PROCEDURE — 52287 CYSTOSCOPY CHEMODENERVATION: CPT | Performed by: STUDENT IN AN ORGANIZED HEALTH CARE EDUCATION/TRAINING PROGRAM

## 2025-04-01 RX ORDER — INDOMETHACIN 25 MG/1
10 CAPSULE ORAL ONCE
Status: COMPLETED | OUTPATIENT
Start: 2025-04-01 | End: 2025-04-01

## 2025-04-01 RX ORDER — LIDOCAINE HYDROCHLORIDE 10 MG/ML
50 INJECTION, SOLUTION INFILTRATION; PERINEURAL ONCE
Status: COMPLETED | OUTPATIENT
Start: 2025-04-01 | End: 2025-04-01

## 2025-04-01 RX ADMIN — ONABOTULINUMTOXINA 200 UNITS: 200 INJECTION, POWDER, LYOPHILIZED, FOR SOLUTION INTRADERMAL; INTRAMUSCULAR at 14:32

## 2025-04-01 RX ADMIN — LIDOCAINE HYDROCHLORIDE 50 ML: 10 INJECTION, SOLUTION INFILTRATION; PERINEURAL at 14:31

## 2025-04-01 RX ADMIN — SODIUM BICARBONATE 10 MEQ: 84 INJECTION, SOLUTION INTRAVENOUS at 14:32

## 2025-04-01 SDOH — ECONOMIC STABILITY: FOOD INSECURITY: WITHIN THE PAST 12 MONTHS, THE FOOD YOU BOUGHT JUST DIDN'T LAST AND YOU DIDN'T HAVE MONEY TO GET MORE.: NEVER TRUE

## 2025-04-01 SDOH — ECONOMIC STABILITY: FOOD INSECURITY: WITHIN THE PAST 12 MONTHS, YOU WORRIED THAT YOUR FOOD WOULD RUN OUT BEFORE YOU GOT MONEY TO BUY MORE.: NEVER TRUE

## 2025-04-01 ASSESSMENT — COLUMBIA-SUICIDE SEVERITY RATING SCALE - C-SSRS
1. IN THE PAST MONTH, HAVE YOU WISHED YOU WERE DEAD OR WISHED YOU COULD GO TO SLEEP AND NOT WAKE UP?: NO
2. HAVE YOU ACTUALLY HAD ANY THOUGHTS OF KILLING YOURSELF?: NO

## 2025-04-01 NOTE — PROGRESS NOTES
"HISTORY OF PRESENT ILLNESS:  Coby Bonner is a 74 y.o. female who presents today for a botox injection. She is a patient of Dr. Hays.          Past Medical History  She has a past medical history of Dysarthria and anarthria, Other conditions influencing health status, and Other specified disorders of bladder.    She has no past medical history of Personal history of irradiation.    Surgical History  She has a past surgical history that includes Other surgical history (11/21/2022); Gallbladder surgery (09/01/2016); Cataract extraction (08/25/2014); and Colonoscopy (06/29/2015).     Social History  She reports that she has never smoked. She has never used smokeless tobacco. She reports that she does not currently use alcohol. She reports that she does not use drugs.    Family History  Family History   Problem Relation Name Age of Onset    Other (Cardiac disorder) Mother      Breast cancer Mother      Stroke Mother      Other (Breast disorder) Sister      Diabetes Maternal Grandfather          Allergies  Patient has no known allergies.      A comprehensive 10+ review of systems was negative except for: see hpi                          PHYSICAL EXAMINATION:  BP Readings from Last 3 Encounters:   04/01/25 135/58   11/14/24 93/52   10/16/24 108/70      Wt Readings from Last 3 Encounters:   01/29/25 54 kg (119 lb 0.8 oz)   11/14/24 54 kg (119 lb)   10/16/24 55.1 kg (121 lb 6.4 oz)      BMI:   Estimated body mass index is 20.42 kg/m² as calculated from the following:    Height as of 1/29/25: 1.626 m (5' 4.02\").    Weight as of 1/29/25: 54 kg (119 lb 0.8 oz).  BSA:   Estimated body surface area is 1.56 meters squared as calculated from the following:    Height as of 1/29/25: 1.626 m (5' 4.02\").    Weight as of 1/29/25: 54 kg (119 lb 0.8 oz).  HEENT: Normocephalic, atraumatic, PER EOMI, nonicteric, trachea normal, thyroid normal, oropharynx normal.  CARDIAC: regular rate & rhythm, S1 & S2 normal.  No heaves, thrills, " gallops or murmurs.  LUNGS: Clear to auscultation, no spinal or CV tenderness.  EXTREMITIES: No evidence of cyanosis, clubbing or edema.       Botox Injection    Coby came today for Botox injection.  I reviewed with her the risks and benefits including ptosis, infection, and bleeding. She has no contraindications. She is on no antibiotics and has no neuromuscular disorders.  Pregnancy is not an issue.     She tolerated the procedure well.  The patient  will return to see me again in three to four months, earlier if there are any problems.     Coby understands the side effects and risks, as well as the necessity for continued treatment to maintain improvement.  Additional therapy may be necessary. Call if there are any problems. See diagram for injection sites and dosages. 200 units were used at a concentration of 10 units per 0.1 mL.         Assessment:  73 yo female who presents for botox     S/P botox, 4/1/25  Rx abx      Follow up in 6 weeks       All questions and concerns were answered and addressed.  The patient expressed understanding and agrees with the plan.     Rolando Caldera MD    Scribe Attestation  By signing my name below, I, Betzaida Gonzalez Scribalan   attest that this documentation has been prepared under the direction and in the presence of Rolando Caldera MD.

## 2025-04-15 ENCOUNTER — APPOINTMENT (OUTPATIENT)
Dept: PRIMARY CARE | Facility: CLINIC | Age: 75
End: 2025-04-15
Payer: COMMERCIAL

## 2025-04-15 VITALS
DIASTOLIC BLOOD PRESSURE: 66 MMHG | OXYGEN SATURATION: 94 % | WEIGHT: 105.8 LBS | BODY MASS INDEX: 18.06 KG/M2 | SYSTOLIC BLOOD PRESSURE: 124 MMHG | HEART RATE: 107 BPM | HEIGHT: 64 IN

## 2025-04-15 DIAGNOSIS — E46 PROTEIN-CALORIE MALNUTRITION, UNSPECIFIED SEVERITY (MULTI): ICD-10-CM

## 2025-04-15 DIAGNOSIS — R51.9 CHRONIC DAILY HEADACHE: ICD-10-CM

## 2025-04-15 DIAGNOSIS — G89.29 CHRONIC LEFT SHOULDER PAIN: ICD-10-CM

## 2025-04-15 DIAGNOSIS — H69.93 DYSFUNCTION OF BOTH EUSTACHIAN TUBES: ICD-10-CM

## 2025-04-15 DIAGNOSIS — E78.5 HYPERLIPIDEMIA, UNSPECIFIED HYPERLIPIDEMIA TYPE: ICD-10-CM

## 2025-04-15 DIAGNOSIS — G35 MULTIPLE SCLEROSIS (MULTI): ICD-10-CM

## 2025-04-15 DIAGNOSIS — R53.2: ICD-10-CM

## 2025-04-15 DIAGNOSIS — R73.9 HYPERGLYCEMIA: ICD-10-CM

## 2025-04-15 DIAGNOSIS — F41.9 ANXIETY DISORDER, UNSPECIFIED: ICD-10-CM

## 2025-04-15 DIAGNOSIS — E55.9 VITAMIN D DEFICIENCY: ICD-10-CM

## 2025-04-15 DIAGNOSIS — E53.8 B12 DEFICIENCY: ICD-10-CM

## 2025-04-15 DIAGNOSIS — Z00.00 HEALTHCARE MAINTENANCE: ICD-10-CM

## 2025-04-15 DIAGNOSIS — N39.0 ACUTE UTI: ICD-10-CM

## 2025-04-15 DIAGNOSIS — G35: ICD-10-CM

## 2025-04-15 DIAGNOSIS — M25.512 CHRONIC LEFT SHOULDER PAIN: ICD-10-CM

## 2025-04-15 DIAGNOSIS — Z00.00 ENCOUNTER FOR MEDICARE ANNUAL WELLNESS EXAM: Primary | ICD-10-CM

## 2025-04-15 DIAGNOSIS — E06.3 ACQUIRED AUTOIMMUNE HYPOTHYROIDISM: ICD-10-CM

## 2025-04-15 PROCEDURE — G0446 INTENS BEHAVE THER CARDIO DX: HCPCS | Performed by: STUDENT IN AN ORGANIZED HEALTH CARE EDUCATION/TRAINING PROGRAM

## 2025-04-15 PROCEDURE — 1124F ACP DISCUSS-NO DSCNMKR DOCD: CPT | Performed by: STUDENT IN AN ORGANIZED HEALTH CARE EDUCATION/TRAINING PROGRAM

## 2025-04-15 PROCEDURE — 3008F BODY MASS INDEX DOCD: CPT | Performed by: STUDENT IN AN ORGANIZED HEALTH CARE EDUCATION/TRAINING PROGRAM

## 2025-04-15 PROCEDURE — 1159F MED LIST DOCD IN RCRD: CPT | Performed by: STUDENT IN AN ORGANIZED HEALTH CARE EDUCATION/TRAINING PROGRAM

## 2025-04-15 PROCEDURE — 1170F FXNL STATUS ASSESSED: CPT | Performed by: STUDENT IN AN ORGANIZED HEALTH CARE EDUCATION/TRAINING PROGRAM

## 2025-04-15 PROCEDURE — 1036F TOBACCO NON-USER: CPT | Performed by: STUDENT IN AN ORGANIZED HEALTH CARE EDUCATION/TRAINING PROGRAM

## 2025-04-15 PROCEDURE — 99215 OFFICE O/P EST HI 40 MIN: CPT | Performed by: STUDENT IN AN ORGANIZED HEALTH CARE EDUCATION/TRAINING PROGRAM

## 2025-04-15 PROCEDURE — G0439 PPPS, SUBSEQ VISIT: HCPCS | Performed by: STUDENT IN AN ORGANIZED HEALTH CARE EDUCATION/TRAINING PROGRAM

## 2025-04-15 RX ORDER — ROSUVASTATIN CALCIUM 10 MG/1
10 TABLET, COATED ORAL DAILY
Qty: 90 TABLET | Refills: 1 | Status: SHIPPED | OUTPATIENT
Start: 2025-04-15

## 2025-04-15 RX ORDER — FLUTICASONE PROPIONATE 50 MCG
2 SPRAY, SUSPENSION (ML) NASAL DAILY
Qty: 48 ML | Refills: 1 | Status: SHIPPED | OUTPATIENT
Start: 2025-04-15

## 2025-04-15 ASSESSMENT — ACTIVITIES OF DAILY LIVING (ADL)
DOING_HOUSEWORK: INDEPENDENT
GROCERY_SHOPPING: INDEPENDENT
DRESSING: INDEPENDENT
MANAGING_FINANCES: INDEPENDENT
TAKING_MEDICATION: INDEPENDENT
BATHING: INDEPENDENT

## 2025-04-15 ASSESSMENT — PATIENT HEALTH QUESTIONNAIRE - PHQ9
1. LITTLE INTEREST OR PLEASURE IN DOING THINGS: NOT AT ALL
2. FEELING DOWN, DEPRESSED OR HOPELESS: NOT AT ALL
SUM OF ALL RESPONSES TO PHQ9 QUESTIONS 1 AND 2: 0

## 2025-04-15 ASSESSMENT — ENCOUNTER SYMPTOMS
LOSS OF SENSATION IN FEET: 0
DEPRESSION: 0
OCCASIONAL FEELINGS OF UNSTEADINESS: 1

## 2025-04-15 NOTE — PROGRESS NOTES
Subjective   Reason for Visit: Coby Bonner is an 74 y.o. female here for a Medicare Wellness visit.          Reviewed all medications by prescribing practitioner or clinical pharmacist (such as prescriptions, OTCs, herbal therapies and supplements) and documented in the medical record.    HPI  Health Maintenance/[facility]  Overall patient is doing well with some concerns.  Shingrix: UTD PCV13:12/2016 PPSV23 9/2020, Flu vaccine UTD, RSV UTD  COVID-19 vaccine 4 doses of pfizer, up-to-date with recent booster  Mammogram complete 1/2025, yearly screenings recommended  Last colonoscopy ten years ago, Cologuard in 04/2023 was negative with 3 year-clearance,  Due 2026. denies family history of colon cancer.   Reports a healthy, balanced diet and limited activity due to MS.   Denies tobacco or etoh use.  DEXA scan performed and showed osteoporosis    2. Hyperlipidemia  Currently taking rosuvastatin 10 mg daily.  Last lipid panel in Jan 2024 within normal limits.  CT Cardiac Calcium score completed December 2020 with score of 523.13.  Does not follow up Dr. Weller     3. Post nasal drip/Eustachian Tube Dysfunction   Uses fluticasone spray daily.   We gave her azelastine at her last visit and she states she does not like that medication and has discontinued it at this point  Exacerbation of symptoms with nasal dripping continuously in the back of her throat     4. Functional quadriplegia secondary to multiple sclerosis  Follows with neurology, Dr. Segovia.  Reports feeling stable currently.   Continues to take Baclofen, Nortriptyline, Primidone     5. Recurrent Urinary Tract Infections/ Hyperactive neurogenic bladder  Follows with urology for frequent UTIs and neurogenic bladder.  Self-caths 5 plus times daily.   Appears that she is now taking Macrobid on a scheduled basis for UTI prophylaxis  Feels well at this time     6. Dermatitis   Has a history of a dermatitis in the past  At that time she was provided triamcinolone  Feels  well without any issues/complaints  Previously saw derm for basal cell removal.  Currently no issues at this time and does not need refills     7. Insomnia  Complaining insomnia for months.   Denies difficulty falling asleep, but will awaken in the night with the urge to urinate and is unable to fall asleep afterwards.  Reports ~4 hours of sleep nightly.  This has been improving since she has been following up with the your gynecologist and even getting Botox injections     8. Acquired autoimmune hypothyroidism    Has a history of elevated TSH of 4.04 in October of 2020   Denies any signs/symptoms of hypothyroidism  Her last TSH was last year at 1.38     9. Vitamin B12 Deficiency  Last Vitamin B12 level was elevated 3315 in 03/2023  Was instructed to gradually lower the dose of B12 supplement  Currently taking B12 supplements daily.  Repeat B12 was well within normal limits at 366     10. Vitamin D Deficiency   Continues to take vitamin D supplementation    11.Itchy   The patient reports experiencing itchiness on the torso and ankle but has not observed any rash. They have taken an over-the-counter anti-itch medication.    12. Left Shoulder pain  For couple of month have been complaining left shoulder pain   Pain worse with movement   Complain of limited range of motion   Is unsure of ibupofen was helping   Patient mention she has done physical thearpy in the past     No Known Allergies    Immunization History   Administered Date(s) Administered    DTaP vaccine, pediatric  (INFANRIX) 03/15/2022    Flu vaccine, quadrivalent, high-dose, preservative free, age 65y+ (FLUZONE) 12/12/2023, 10/02/2024    Flu vaccine, trivalent, preservative free, HIGH-DOSE, age 65y+ (Fluzone) 10/10/2016, 10/14/2017, 10/01/2018, 10/02/2024    Influenza, Seasonal, Quadrivalent, Adjuvanted 10/10/2021, 11/01/2022    Influenza, seasonal, injectable 11/01/2022    Pfizer COVID-19 vaccine, 12 years and older, (30mcg/0.3mL) (Comirnaty) 11/27/2023,  "11/01/2024    Pfizer COVID-19 vaccine, bivalent, age 12 years and older (30 mcg/0.3 mL) 11/01/2022    Pfizer Purple Cap SARS-CoV-2 03/11/2021, 04/01/2021, 10/10/2021, 11/01/2024    Pneumococcal conjugate vaccine, 13-valent (PREVNAR 13) 12/01/2016    Pneumococcal polysaccharide vaccine, 23-valent, age 2 years and older (PNEUMOVAX 23) 09/01/2020, 09/17/2020    RESPIRATORY SYNCYTIAL VIRUS (RSV), ELIGIBLE PREGNANT PTS, 0.5 ML (ABRYSVO) 09/30/2023    Tdap vaccine, age 7 year and older (BOOSTRIX, ADACEL) 03/15/2022    Zoster vaccine, recombinant, adult (SHINGRIX) 01/01/2018, 08/08/2018, 10/30/2018       Patient Self Assessment of Health Status  Patient Self Assessment: Good    Nutrition and Exercise  Current Diet: Well Balanced Diet  Adequate Fluid Intake: No  Caffeine: Yes  Exercise Frequency: Infrequently    Functional Ability/Level of Safety  Cognitive Impairment Observed: No cognitive impairment observed    Home Safety Risk Factors: None    Patient Care Team:  Ante T Pletikosic, DO as PCP - General  Ante T Pletikosic, DO as PCP - [facility] Attributed Provider     Review of Systems  All pertinent positive symptoms are included in the history of present illness.    All other systems have been reviewed and are negative and noncontributory to this patient's current ailments.    Objective   Vitals:  /66 (BP Location: Left arm, Patient Position: Sitting, BP Cuff Size: Adult)   Pulse 107   Ht 1.626 m (5' 4\")   Wt 48 kg (105 lb 12.8 oz)   LMP  (LMP Unknown)   SpO2 94%   BMI 18.16 kg/m²     No visits with results within 6 Month(s) from this visit.   Latest known visit with results is:   Procedure Visit on 10/14/2024   Component Date Value Ref Range Status    POC Color, Urine 10/14/2024 Yellow  Straw, Yellow, Light-Yellow Final    POC Appearance, Urine 10/14/2024 Clear  Clear Final    POC Glucose, Urine 10/14/2024 NEGATIVE  NEGATIVE mg/dl Final    POC Bilirubin, Urine 10/14/2024 NEGATIVE  NEGATIVE Final    POC " Ketones, Urine 10/14/2024 15 (1+) (A)  NEGATIVE mg/dl Final    POC Specific Gravity, Urine 10/14/2024 >=1.030  1.005 - 1.035 Final    POC Blood, Urine 10/14/2024 TRACE-Intact (A)  NEGATIVE Final    POC PH, Urine 10/14/2024 6.5  No Reference Range Established PH Final    POC Protein, Urine 10/14/2024 TRACE (A)  NEGATIVE, 30 (1+) mg/dl Final    POC Urobilinogen, Urine 10/14/2024 0.2  0.2, 1.0 EU/DL Final    Poc Nitrite, Urine 10/14/2024 POSITIVE (A)  NEGATIVE Final    POC Leukocytes, Urine 10/14/2024 LARGE (3+) (A)  NEGATIVE Final    POC Color, Urine 10/14/2024 Yellow  Straw, Yellow, Light-Yellow Final    POC Appearance, Urine 10/14/2024 Clear  Clear Final    POC Glucose, Urine 10/14/2024 NEGATIVE  NEGATIVE mg/dl Corrected    POC Bilirubin, Urine 10/14/2024 NEGATIVE  NEGATIVE Final    POC Ketones, Urine 10/14/2024 15 (1+) (A)  NEGATIVE mg/dl Corrected    POC Specific Gravity, Urine 10/14/2024 >=1.030  1.005 - 1.035 Final    POC Blood, Urine 10/14/2024 TRACE-Intact (A)  NEGATIVE Corrected    POC PH, Urine 10/14/2024 6.5  No Reference Range Established PH Corrected    POC Protein, Urine 10/14/2024 TRACE (A)  NEGATIVE, 30 (1+) mg/dl Final    POC Urobilinogen, Urine 10/14/2024 0.2  0.2, 1.0 EU/DL Final    Poc Nitrite, Urine 10/14/2024 POSITIVE (A)  NEGATIVE Corrected    POC Leukocytes, Urine 10/14/2024 LARGE (3+) (A)  NEGATIVE Corrected       Physical Exam  CONSTITUTIONAL - well nourished, well developed, looks like stated age, in no acute distress, not ill-appearing, and not tired appearing  SKIN - dry skin noted  HEAD - no trauma, normocephalic  EYES - extraocular muscles are intact, and normal external exam  ENT -  uvula midline, normal tongue movement and throat normal, no exudate  NECK - supple without rigidity, no neck mass was observed, no thyromegaly or thyroid nodules  CHEST - clear to auscultation, no wheezing, no crackles and no rales, good effort  CARDIAC - regular rate and regular rhythm, no skipped beats, no  murmur  ABDOMEN - no organomegaly, soft, nontender, nondistended, no guarding/rebound/rigidity,   EXTREMITIES - +1 bilateral pitting edema, no deformities, mild scoliosis present  NEUROLOGICAL - abnormal/slow gait, essential tremor, irregular and abnormal balance, uses a walker   PSYCHIATRIC - alert, pleasant and cordial, age-appropriate  IMMUNOLOGIC - no cervical lymphadenopathy    Assessment/Plan   Health Maintenance/MCR  A complete history and physical were performed today.  We discussed all of your lab work in great detail that was done in the past but no recent lab work was ordered to be done at your earliest mutual convenience  We will review test results once available and make recommendation accordingly  We did discuss osteoporosis and the findings on your DEXA scan   -Medication was recommended but deferred at this time, I recommend continuing vitamin D   and calcium  Mammogram up-to-date    Hyperlipidemia  Continue taking rosuvastatin 10 mg daily.  Refill have been sent to your pharmacy.  Stable without any changes  Continue to follow with Dr. Weller as needed.  The 10-year ASCVD risk score (Gale RUSHING, et al., 2019) is: 11.7%    Values used to calculate the score:      Age: 74 years      Sex: Female      Is Non- : No      Diabetic: No      Tobacco smoker: No      Systolic Blood Pressure: 124 mmHg      Is BP treated: No      HDL Cholesterol: 90.7 mg/dL      Total Cholesterol: 176 mg/dL   Cardiovascular risk discussed and, if needed, lifestyle modifications recommended, including nutritional choices, exercise, and elimination of habits contributing to risk.    We agreed on a plan to reduce the current cardiovascular risk     Nasal drip/Eustachian Tube Dysfunction  Use fluticasone spray daily  Please let us know if this worsens or causes nausea as previously and please notify the office and I will direct you to an otolaryngologist     Functional quadriplegia secondary to multiple  sclerosis  Continue to follow with neurology Dr. Segovia.   Continue to take Baclofen, Nortriptyline, Primidone as he prescribes.  Please contact his office for a refill of Primidone     Recurrent Urinary Tract Infections/ Hyperactive neurogenic bladder  Continue to follow with urology.  Stable, no changes in recommendations.     Dermatitis   Continue to follow with dermatology or our office as needed  No further recommendations at this time     Insomnia  Continue to follow up with neurologist for treatment recommendations regarding this.     Acquired autoimmune hypothyroidism  Will get up date Lab work   Will continue monitoring every year     Vitamin B12 Deficiency  Vitamin B12 within normal limits  Continue to take your Vitamin B12 supplement.     Vitamin D Deficiency   Vitamin D within normal limits  Continue to take your Vitamin D supplement.     11.  Skin pruritus  I recommend you continue monitoring and if this worsens we will discuss a dermatology referral    12. Left Shoulder pain  We discussed the possibility of administering an injection and considered obtaining an X-ray    Time Spent  Prep time on day of patient encounter: 10 minutes  Time spent directly with patient, family or caregiver: 45 minutes  Additional Time Spent on Patient Care Activities: 0 minutes  Documentation Time: 10 minutes  Other Time Spent: 0 minutes  Total: 65 minutes        This note was initiated by a medical student.  I was present with the medical student who participated in the documentation of this note.  I have personally seen and examined the patient and performed the medical decision-making components.   I have reviewed the medical student documentation and verified the findings in the note as written with additions or exceptions as stated in the body of the note.

## 2025-04-16 LAB
25(OH)D3+25(OH)D2 SERPL-MCNC: 38 NG/ML (ref 30–100)
ALBUMIN SERPL-MCNC: 4.3 G/DL (ref 3.6–5.1)
ALP SERPL-CCNC: 69 U/L (ref 37–153)
ALT SERPL-CCNC: 12 U/L (ref 6–29)
ANION GAP SERPL CALCULATED.4IONS-SCNC: 9 MMOL/L (CALC) (ref 7–17)
AST SERPL-CCNC: 19 U/L (ref 10–35)
BILIRUB SERPL-MCNC: 0.6 MG/DL (ref 0.2–1.2)
BUN SERPL-MCNC: 16 MG/DL (ref 7–25)
CALCIUM SERPL-MCNC: 9.3 MG/DL (ref 8.6–10.4)
CHLORIDE SERPL-SCNC: 104 MMOL/L (ref 98–110)
CHOLEST SERPL-MCNC: 177 MG/DL
CHOLEST/HDLC SERPL: 1.9 (CALC)
CO2 SERPL-SCNC: 28 MMOL/L (ref 20–32)
CREAT SERPL-MCNC: 0.74 MG/DL (ref 0.6–1)
EGFRCR SERPLBLD CKD-EPI 2021: 85 ML/MIN/1.73M2
EST. AVERAGE GLUCOSE BLD GHB EST-MCNC: 111 MG/DL
EST. AVERAGE GLUCOSE BLD GHB EST-SCNC: 6.2 MMOL/L
GLUCOSE SERPL-MCNC: 91 MG/DL (ref 65–99)
HBA1C MFR BLD: 5.5 %
HDLC SERPL-MCNC: 92 MG/DL
LDLC SERPL CALC-MCNC: 70 MG/DL (CALC)
NONHDLC SERPL-MCNC: 85 MG/DL (CALC)
POTASSIUM SERPL-SCNC: 4.3 MMOL/L (ref 3.5–5.3)
PROT SERPL-MCNC: 6.5 G/DL (ref 6.1–8.1)
SODIUM SERPL-SCNC: 141 MMOL/L (ref 135–146)
TRIGL SERPL-MCNC: 69 MG/DL
TSH SERPL-ACNC: 1.43 MIU/L (ref 0.4–4.5)
VIT B12 SERPL-MCNC: 260 PG/ML (ref 200–1100)

## 2025-04-16 NOTE — RESULT ENCOUNTER NOTE
Cholesterol looks good at 177, HDL 92, triglycerides 69, LDL 70    Sugar, kidneys, liver, electrolytes are all within normal limits alongside a normal thyroid    Hemoglobin A1c fantastic at 5.5%    Vitamin D within normal limits alongside a normal vitamin B12

## 2025-05-01 DIAGNOSIS — L30.9 DERMATITIS: ICD-10-CM

## 2025-05-02 RX ORDER — TRIAMCINOLONE ACETONIDE 1 MG/G
OINTMENT TOPICAL 2 TIMES DAILY
Qty: 80 G | Refills: 1 | OUTPATIENT
Start: 2025-05-02

## 2025-05-06 RX ORDER — TRIAMCINOLONE ACETONIDE 1 MG/G
OINTMENT TOPICAL 2 TIMES DAILY
Qty: 80 G | Refills: 1 | Status: SHIPPED | OUTPATIENT
Start: 2025-05-06

## 2025-07-01 ENCOUNTER — APPOINTMENT (OUTPATIENT)
Dept: PRIMARY CARE | Facility: CLINIC | Age: 75
End: 2025-07-01
Payer: MEDICARE

## 2025-07-01 VITALS
OXYGEN SATURATION: 96 % | HEART RATE: 98 BPM | BODY MASS INDEX: 20.87 KG/M2 | SYSTOLIC BLOOD PRESSURE: 110 MMHG | DIASTOLIC BLOOD PRESSURE: 60 MMHG | WEIGHT: 121.6 LBS

## 2025-07-01 DIAGNOSIS — G47.00 INSOMNIA, UNSPECIFIED TYPE: ICD-10-CM

## 2025-07-01 DIAGNOSIS — E06.3 ACQUIRED AUTOIMMUNE HYPOTHYROIDISM: ICD-10-CM

## 2025-07-01 DIAGNOSIS — R53.2: ICD-10-CM

## 2025-07-01 DIAGNOSIS — E78.5 HYPERLIPIDEMIA, UNSPECIFIED HYPERLIPIDEMIA TYPE: ICD-10-CM

## 2025-07-01 DIAGNOSIS — E55.9 VITAMIN D DEFICIENCY: ICD-10-CM

## 2025-07-01 DIAGNOSIS — L30.9 DERMATITIS: ICD-10-CM

## 2025-07-01 DIAGNOSIS — F41.9 ANXIETY DISORDER, UNSPECIFIED: ICD-10-CM

## 2025-07-01 DIAGNOSIS — G35 MULTIPLE SCLEROSIS (MULTI): ICD-10-CM

## 2025-07-01 DIAGNOSIS — N39.0 ACUTE UTI: ICD-10-CM

## 2025-07-01 DIAGNOSIS — E53.8 B12 DEFICIENCY: Primary | ICD-10-CM

## 2025-07-01 DIAGNOSIS — H69.93 DYSFUNCTION OF BOTH EUSTACHIAN TUBES: ICD-10-CM

## 2025-07-01 DIAGNOSIS — R51.9 CHRONIC DAILY HEADACHE: ICD-10-CM

## 2025-07-01 DIAGNOSIS — G35: ICD-10-CM

## 2025-07-01 DIAGNOSIS — E46 PROTEIN-CALORIE MALNUTRITION, UNSPECIFIED SEVERITY (MULTI): ICD-10-CM

## 2025-07-01 PROCEDURE — G2211 COMPLEX E/M VISIT ADD ON: HCPCS | Performed by: STUDENT IN AN ORGANIZED HEALTH CARE EDUCATION/TRAINING PROGRAM

## 2025-07-01 PROCEDURE — 99215 OFFICE O/P EST HI 40 MIN: CPT | Performed by: STUDENT IN AN ORGANIZED HEALTH CARE EDUCATION/TRAINING PROGRAM

## 2025-07-01 PROCEDURE — 1159F MED LIST DOCD IN RCRD: CPT | Performed by: STUDENT IN AN ORGANIZED HEALTH CARE EDUCATION/TRAINING PROGRAM

## 2025-07-01 PROCEDURE — 1036F TOBACCO NON-USER: CPT | Performed by: STUDENT IN AN ORGANIZED HEALTH CARE EDUCATION/TRAINING PROGRAM

## 2025-07-01 RX ORDER — TRIAMCINOLONE ACETONIDE 1 MG/G
OINTMENT TOPICAL 2 TIMES DAILY
Qty: 80 G | Refills: 1 | Status: SHIPPED | OUTPATIENT
Start: 2025-07-01

## 2025-07-01 RX ORDER — AZELASTINE 1 MG/ML
1 SPRAY, METERED NASAL 2 TIMES DAILY
Qty: 30 ML | Refills: 2 | Status: SHIPPED | OUTPATIENT
Start: 2025-07-01

## 2025-07-01 RX ORDER — ROSUVASTATIN CALCIUM 10 MG/1
10 TABLET, COATED ORAL DAILY
Qty: 90 TABLET | Refills: 1 | Status: SHIPPED | OUTPATIENT
Start: 2025-07-01

## 2025-07-01 RX ORDER — FLUTICASONE PROPIONATE 50 MCG
2 SPRAY, SUSPENSION (ML) NASAL DAILY
Qty: 48 ML | Refills: 1 | Status: SHIPPED | OUTPATIENT
Start: 2025-07-01

## 2025-07-01 ASSESSMENT — PATIENT HEALTH QUESTIONNAIRE - PHQ9
1. LITTLE INTEREST OR PLEASURE IN DOING THINGS: NOT AT ALL
SUM OF ALL RESPONSES TO PHQ9 QUESTIONS 1 AND 2: 0
2. FEELING DOWN, DEPRESSED OR HOPELESS: NOT AT ALL

## 2025-07-01 NOTE — PROGRESS NOTES
Subjective   Reason for Visit: Coby Bonner is an 74 y.o. female here for a follow-up visit         Reviewed all medications by prescribing practitioner or clinical pharmacist (such as prescriptions, OTCs, herbal therapies and supplements) and documented in the medical record.    HPI   Hyperlipidemia  Currently taking rosuvastatin 10 mg daily.  Most recent cholesterol panel shows stability with an LDL at 70  CT Cardiac Calcium score completed December 2020 with score of 523.13.  Does not follow up Dr. Weller     2.  Post nasal drip/Eustachian Tube Dysfunction   Uses fluticasone spray daily.   We gave her azelastine at her last visit and she states she does not like that medication and has discontinued it at this point  Exacerbation of symptoms with nasal dripping continuously in the back of her throat     3.  Functional quadriplegia secondary to multiple sclerosis  Follows with neurology, Dr. Segovia.  Reports feeling stable currently.   Continues to take Baclofen, Nortriptyline, Primidone     4.. Recurrent Urinary Tract Infections/ Hyperactive neurogenic bladder  Follows with urology for frequent UTIs and neurogenic bladder.  Self-caths 5 plus times daily.   Appears that she is now taking Macrobid on a scheduled basis for UTI prophylaxis  Feels well at this time     5. Dermatitis   Has a history of a dermatitis in the past  At that time she was provided triamcinolone  Feels well without any issues/complaints  Previously saw derm for basal cell removal.  Currently no issues at this time and does not need refills     6. Insomnia  Complaining insomnia for months.   Denies difficulty falling asleep, but will awaken in the night with the urge to urinate and is unable to fall asleep afterwards.  Reports ~4 hours of sleep nightly.  This has been improving since she has been following up with the your gynecologist and even getting Botox injections     7. Acquired autoimmune hypothyroidism    Has a history of elevated TSH  of 4.04 in October of 2020   Denies any signs/symptoms of hypothyroidism  Most recent TSH well within normal limits at 1.43     8. Vitamin B12 Deficiency  Last Vitamin B12 level was elevated 3315 in 03/2023  Was instructed to gradually lower the dose of B12 supplement  Currently taking B12 supplements daily.  Most recent B12 well within normal limits at 260     9.  Vitamin D Deficiency   Continues to take vitamin D supplementation        No Known Allergies    Immunization History   Administered Date(s) Administered    DTaP vaccine, pediatric  (INFANRIX) 03/15/2022    Flu vaccine, quadrivalent, high-dose, preservative free, age 65y+ (FLUZONE) 12/12/2023, 10/02/2024    Flu vaccine, trivalent, preservative free, HIGH-DOSE, age 65y+ (Fluzone) 10/10/2016, 10/14/2017, 10/01/2018, 10/02/2024    Influenza, Seasonal, Quadrivalent, Adjuvanted 10/10/2021, 11/01/2022    Influenza, seasonal, injectable 11/01/2022    Pfizer COVID-19 vaccine, 12 years and older, (30mcg/0.3mL) (Comirnaty) 11/27/2023, 11/01/2024    Pfizer COVID-19 vaccine, bivalent, age 12 years and older (30 mcg/0.3 mL) 11/01/2022    Pfizer Purple Cap SARS-CoV-2 03/11/2021, 04/01/2021, 10/10/2021, 11/01/2024    Pneumococcal conjugate vaccine, 13-valent (PREVNAR 13) 12/01/2016    Pneumococcal polysaccharide vaccine, 23-valent, age 2 years and older (PNEUMOVAX 23) 09/01/2020, 09/17/2020    RESPIRATORY SYNCYTIAL VIRUS (RSV), ELIGIBLE PREGNANT PTS, 0.5 ML (ABRYSVO) 09/30/2023    Tdap vaccine, age 7 year and older (BOOSTRIX, ADACEL) 03/15/2022    Zoster vaccine, recombinant, adult (SHINGRIX) 01/01/2018, 08/08/2018, 10/30/2018       Review of Systems  All pertinent positive symptoms are included in the history of present illness.    All other systems have been reviewed and are negative and noncontributory to this patient's current ailments.    Objective   Vitals:  /60 (BP Location: Left arm, Patient Position: Sitting, BP Cuff Size: Adult)   Pulse 98   Wt 55.2 kg  (121 lb 9.6 oz)   LMP  (LMP Unknown)   SpO2 96%   BMI 20.87 kg/m²     Office Visit on 04/15/2025   Component Date Value Ref Range Status    CHOLESTEROL, TOTAL 04/15/2025 177  <200 mg/dL Final    HDL CHOLESTEROL 04/15/2025 92  > OR = 50 mg/dL Final    TRIGLYCERIDES 04/15/2025 69  <150 mg/dL Final    LDL-CHOLESTEROL 04/15/2025 70  mg/dL (calc) Final    Comment: Reference range: <100     Desirable range <100 mg/dL for primary prevention;    <70 mg/dL for patients with CHD or diabetic patients   with > or = 2 CHD risk factors.     LDL-C is now calculated using the Sanjuana   calculation, which is a validated novel method providing   better accuracy than the Friedewald equation in the   estimation of LDL-C.   Johnathan MURILLO et al. ANASTACIO. 2013;310(19): 5088-3543   (http://education.ThinkHR.House Party/faq/PBH398)      CHOL/HDLC RATIO 04/15/2025 1.9  <5.0 (calc) Final    NON HDL CHOLESTEROL 04/15/2025 85  <130 mg/dL (calc) Final    Comment: For patients with diabetes plus 1 major ASCVD risk   factor, treating to a non-HDL-C goal of <100 mg/dL   (LDL-C of <70 mg/dL) is considered a therapeutic   option.      GLUCOSE 04/15/2025 91  65 - 99 mg/dL Final    Comment:               Fasting reference interval         UREA NITROGEN (BUN) 04/15/2025 16  7 - 25 mg/dL Final    CREATININE 04/15/2025 0.74  0.60 - 1.00 mg/dL Final    EGFR 04/15/2025 85  > OR = 60 mL/min/1.73m2 Final    SODIUM 04/15/2025 141  135 - 146 mmol/L Final    POTASSIUM 04/15/2025 4.3  3.5 - 5.3 mmol/L Final    CHLORIDE 04/15/2025 104  98 - 110 mmol/L Final    CARBON DIOXIDE 04/15/2025 28  20 - 32 mmol/L Final    ELECTROLYTE BALANCE 04/15/2025 9  7 - 17 mmol/L (calc) Final    CALCIUM 04/15/2025 9.3  8.6 - 10.4 mg/dL Final    PROTEIN, TOTAL 04/15/2025 6.5  6.1 - 8.1 g/dL Final    ALBUMIN 04/15/2025 4.3  3.6 - 5.1 g/dL Final    BILIRUBIN, TOTAL 04/15/2025 0.6  0.2 - 1.2 mg/dL Final    ALKALINE PHOSPHATASE 04/15/2025 69  37 - 153 U/L Final    AST 04/15/2025 19   10 - 35 U/L Final    ALT 04/15/2025 12  6 - 29 U/L Final    TSH W/REFLEX TO FT4 04/15/2025 1.43  0.40 - 4.50 mIU/L Final    HEMOGLOBIN A1c 04/15/2025 5.5  <5.7 % Final    Comment: For the purpose of screening for the presence of  diabetes:     <5.7%       Consistent with the absence of diabetes  5.7-6.4%    Consistent with increased risk for diabetes              (prediabetes)  > or =6.5%  Consistent with diabetes     This assay result is consistent with a decreased risk  of diabetes.     Currently, no consensus exists regarding use of  hemoglobin A1c for diagnosis of diabetes in children.     According to American Diabetes Association (ADA)  guidelines, hemoglobin A1c <7.0% represents optimal  control in non-pregnant diabetic patients. Different  metrics may apply to specific patient populations.   Standards of Medical Care in Diabetes(ADA).          eAG (mg/dL) 04/15/2025 111  mg/dL Final    eAG (mmol/L) 04/15/2025 6.2  mmol/L Final    VITAMIN D,25-OH,TOTAL,IA 04/15/2025 38  30 - 100 ng/mL Final    Comment: Vitamin D Status         25-OH Vitamin D:     Deficiency:                    <20 ng/mL  Insufficiency:             20 - 29 ng/mL  Optimal:                 > or = 30 ng/mL     For 25-OH Vitamin D testing on patients on   D2-supplementation and patients for whom quantitation   of D2 and D3 fractions is required, the QuestAssureD(TM)  25-OH VIT D, (D2,D3), LC/MS/MS is recommended: order   code 47666 (patients >2yrs).     See Note 1     Note 1     For additional information, please refer to   http://education.Ak?Lex.Fanatics/faq/LEL210   (This link is being provided for informational/  educational purposes only.)      VITAMIN B12 04/15/2025 260  200 - 1,100 pg/mL Final    Comment:    Please Note: Although the reference range for vitamin  B12 is 200-1100 pg/mL, it has been reported that between  5 and 10% of patients with values between 200 and 400  pg/mL may experience neuropsychiatric and  hematologic  abnormalities due to occult B12 deficiency; less than 1%  of patients with values above 400 pg/mL will have symptoms.            Physical Exam  CONSTITUTIONAL - well nourished, well developed, looks like stated age, in no acute distress, not ill-appearing, and not tired appearing  SKIN - dry skin noted  HEAD - no trauma, normocephalic  EYES - extraocular muscles are intact, and normal external exam  ENT -  uvula midline, normal tongue movement and throat normal, no exudate  NECK - supple without rigidity, no neck mass was observed, no thyromegaly or thyroid nodules  CHEST - clear to auscultation, no wheezing, no crackles and no rales, good effort  CARDIAC - regular rate and regular rhythm, no skipped beats, no murmur  ABDOMEN - no organomegaly, soft, nontender, nondistended, no guarding/rebound/rigidity,   EXTREMITIES - +1 bilateral pitting edema, no deformities, mild scoliosis present  NEUROLOGICAL - abnormal/slow gait, essential tremor, irregular and abnormal balance, uses a walker   PSYCHIATRIC - alert, pleasant and cordial, age-appropriate  IMMUNOLOGIC - no cervical lymphadenopathy    Assessment/Plan   1. Hyperlipidemia  Continue taking rosuvastatin 10 mg daily.  Refill have been sent to your pharmacy.  Stable without any changes  Continue to follow with Dr. Weller as needed.  The 10-year ASCVD risk score (Gale DK, et al., 2019) is: 9.3%    Values used to calculate the score:      Age: 74 years      Sex: Female      Is Non- : No      Diabetic: No      Tobacco smoker: No      Systolic Blood Pressure: 110 mmHg      Is BP treated: No      HDL Cholesterol: 92 mg/dL      Total Cholesterol: 177 mg/dL   Cardiovascular risk discussed and, if needed, lifestyle modifications recommended, including nutritional choices, exercise, and elimination of habits contributing to risk.    We agreed on a plan to reduce the current cardiovascular risk     2. Nasal drip/Eustachian Tube  Dysfunction  Use fluticasone spray daily  Please let us know if this worsens or causes nausea as previously and please notify the office and I will direct you to an otolaryngologist     3. Functional quadriplegia secondary to multiple sclerosis  Continue to follow with neurology Dr. Segovia.   Continue to take Baclofen, Nortriptyline, Primidone as he prescribes.  Please contact his office for a refill of Primidone     4. Recurrent Urinary Tract Infections/ Hyperactive neurogenic bladder  Continue to follow with urology.  Stable, no changes in recommendations.     5. Dermatitis   Continue to follow with dermatology or our office as needed  No further recommendations at this time     6. Insomnia  Continue to follow up with neurologist for treatment recommendations regarding this.     7. Acquired autoimmune hypothyroidism  Lab work within normal limits     8. Vitamin B12 Deficiency  Vitamin B12 within normal limits  Continue to take your Vitamin B12 supplement.     9. Vitamin D Deficiency   Vitamin D within normal limits  Continue to take your Vitamin D supplement.     10.  Dermatitis  Refills of the triamcinolone also sent to your pharmacy    Time Spent  Prep time on day of patient encounter: 10 minutes  Time spent directly with patient, family or caregiver: 30 minutes  Additional Time Spent on Patient Care Activities: 0 minutes  Documentation Time: 10 minutes  Other Time Spent: 0 minutes  Total: 50 minutes

## 2025-07-06 NOTE — PROGRESS NOTES
"HISTORY OF PRESENT ILLNESS:  Coby is a 74 y.o. female who presents today for a botox injection.          Past Medical History  She has a past medical history of Dysarthria and anarthria, Other conditions influencing health status, and Other specified disorders of bladder.    She has no past medical history of Personal history of irradiation.    Surgical History  She has a past surgical history that includes Other surgical history (11/21/2022); Gallbladder surgery (09/01/2016); Cataract extraction (08/25/2014); and Colonoscopy (06/29/2015).     Social History  She reports that she has never smoked. She has never used smokeless tobacco. She reports that she does not currently use alcohol. She reports that she does not use drugs.    Family History  Family History[1]     Allergies  Patient has no known allergies.      A comprehensive 10+ review of systems was negative except for: see hpi                          PHYSICAL EXAMINATION:  BP Readings from Last 3 Encounters:   07/01/25 110/60   04/15/25 124/66   04/01/25 135/58      Wt Readings from Last 3 Encounters:   07/01/25 55.2 kg (121 lb 9.6 oz)   04/15/25 48 kg (105 lb 12.8 oz)   01/29/25 54 kg (119 lb 0.8 oz)      BMI: Estimated body mass index is 20.87 kg/m² as calculated from the following:    Height as of 4/15/25: 1.626 m (5' 4\").    Weight as of 7/1/25: 55.2 kg (121 lb 9.6 oz).  BSA: Estimated body surface area is 1.58 meters squared as calculated from the following:    Height as of 4/15/25: 1.626 m (5' 4\").    Weight as of 7/1/25: 55.2 kg (121 lb 9.6 oz).  HEENT: Normocephalic, atraumatic, PER EOMI, nonicteric, trachea normal, thyroid normal, oropharynx normal.  CARDIAC: regular rate & rhythm, S1 & S2 normal.  No heaves, thrills, gallops or murmurs.  LUNGS: Clear to auscultation, no spinal or CV tenderness.  EXTREMITIES: No evidence of cyanosis, clubbing or edema.       Botox Injection    Coby came today for Botox injection.  I reviewed with her the risks " and benefits including ptosis, infection, and bleeding. She has no contraindications. She is on no antibiotics and has no neuromuscular disorders.  Pregnancy is not an issue.     She tolerated the procedure well.  The patient  will return to see me again in three to four months, earlier if there are any problems.     Coby understands the side effects and risks, as well as the necessity for continued treatment to maintain improvement.  Additional therapy may be necessary. Call if there are any problems. See diagram for injection sites and dosages. 200 units were used at a concentration of 10 units per 0.1 mL.        Assessment:  74 y.o. with OAB       S/P botox, 200 units, 4/1/25, 7/8/25  Rx abx       Follow up in 3 months for botox        All questions and concerns were answered and addressed.  The patient expressed understanding and agrees with the plan.     Rolando Caldera MD    Scribe Attestation  By signing my name below, I Betzaidarosas Gonzalez, Scribe   attest that this documentation has been prepared under the direction and in the presence of Rolando Caldera MD.         [1]   Family History  Problem Relation Name Age of Onset    Other (Cardiac disorder) Mother      Breast cancer Mother      Stroke Mother      Other (Breast disorder) Sister      Diabetes Maternal Grandfather

## 2025-07-08 ENCOUNTER — APPOINTMENT (OUTPATIENT)
Dept: UROLOGY | Facility: CLINIC | Age: 75
End: 2025-07-08
Payer: MEDICARE

## 2025-07-08 DIAGNOSIS — R39.9 URINARY SYMPTOM OR SIGN: ICD-10-CM

## 2025-07-08 DIAGNOSIS — N32.81 OAB (OVERACTIVE BLADDER): ICD-10-CM

## 2025-07-08 LAB
POC APPEARANCE, URINE: CLEAR
POC BILIRUBIN, URINE: NEGATIVE
POC BLOOD, URINE: ABNORMAL
POC COLOR, URINE: YELLOW
POC GLUCOSE, URINE: NEGATIVE MG/DL
POC KETONES, URINE: NEGATIVE MG/DL
POC LEUKOCYTES, URINE: ABNORMAL
POC NITRITE,URINE: POSITIVE
POC PH, URINE: 6.5 PH
POC PROTEIN, URINE: ABNORMAL MG/DL
POC SPECIFIC GRAVITY, URINE: 1.02
POC UROBILINOGEN, URINE: 0.2 EU/DL

## 2025-07-08 PROCEDURE — 52287 CYSTOSCOPY CHEMODENERVATION: CPT | Performed by: STUDENT IN AN ORGANIZED HEALTH CARE EDUCATION/TRAINING PROGRAM

## 2025-07-08 RX ORDER — NITROFURANTOIN 25; 75 MG/1; MG/1
100 CAPSULE ORAL 2 TIMES DAILY
Qty: 10 CAPSULE | Refills: 0 | Status: SHIPPED | OUTPATIENT
Start: 2025-07-08 | End: 2025-07-13

## 2025-07-08 RX ORDER — INDOMETHACIN 25 MG/1
10 CAPSULE ORAL ONCE
Status: COMPLETED | OUTPATIENT
Start: 2025-07-08 | End: 2025-07-08

## 2025-07-08 RX ORDER — LIDOCAINE HYDROCHLORIDE 10 MG/ML
50 INJECTION, SOLUTION INFILTRATION; PERINEURAL ONCE
Status: COMPLETED | OUTPATIENT
Start: 2025-07-08 | End: 2025-07-08

## 2025-07-08 RX ADMIN — INDOMETHACIN 10 MEQ: 25 CAPSULE ORAL at 13:42

## 2025-07-08 RX ADMIN — LIDOCAINE HYDROCHLORIDE 50 ML: 10 INJECTION, SOLUTION INFILTRATION; PERINEURAL at 13:42

## 2025-07-11 DIAGNOSIS — N39.0 ACUTE UTI: ICD-10-CM

## 2025-07-11 LAB — BACTERIA UR CULT: ABNORMAL

## 2025-07-11 RX ORDER — SULFAMETHOXAZOLE AND TRIMETHOPRIM 800; 160 MG/1; MG/1
1 TABLET ORAL 2 TIMES DAILY
Qty: 10 TABLET | Refills: 0 | Status: SHIPPED | OUTPATIENT
Start: 2025-07-11 | End: 2025-07-16

## 2025-10-07 ENCOUNTER — APPOINTMENT (OUTPATIENT)
Dept: UROLOGY | Facility: CLINIC | Age: 75
End: 2025-10-07
Payer: MEDICARE

## 2026-04-07 ENCOUNTER — APPOINTMENT (OUTPATIENT)
Dept: PRIMARY CARE | Facility: CLINIC | Age: 76
End: 2026-04-07
Payer: MEDICARE